# Patient Record
Sex: FEMALE | Race: WHITE | NOT HISPANIC OR LATINO | Employment: OTHER | URBAN - METROPOLITAN AREA
[De-identification: names, ages, dates, MRNs, and addresses within clinical notes are randomized per-mention and may not be internally consistent; named-entity substitution may affect disease eponyms.]

---

## 2021-04-14 PROBLEM — R93.2 ABNORMAL CT OF LIVER: Status: ACTIVE | Noted: 2021-04-14

## 2021-04-14 PROBLEM — K58.9 IBS (IRRITABLE BOWEL SYNDROME): Status: ACTIVE | Noted: 2021-04-14

## 2021-04-14 PROBLEM — M25.551 RIGHT HIP PAIN: Status: ACTIVE | Noted: 2021-04-14

## 2021-04-14 PROBLEM — M54.9 BACK PAIN: Status: ACTIVE | Noted: 2021-04-14

## 2024-11-12 ENCOUNTER — HOSPITAL ENCOUNTER (INPATIENT)
Facility: HOSPITAL | Age: 79
LOS: 3 days | Discharge: NURSING, REHAB OR LONG-TERM CARE | DRG: 854 | End: 2024-11-15
Attending: EMERGENCY MEDICINE | Admitting: INTERNAL MEDICINE
Payer: COMMERCIAL

## 2024-11-12 ENCOUNTER — ANESTHESIA EVENT (INPATIENT)
Dept: PERIOP | Facility: HOSPITAL | Age: 79
DRG: 854 | End: 2024-11-12
Payer: COMMERCIAL

## 2024-11-12 ENCOUNTER — APPOINTMENT (EMERGENCY)
Dept: CT IMAGING | Facility: HOSPITAL | Age: 79
DRG: 854 | End: 2024-11-12
Payer: COMMERCIAL

## 2024-11-12 ENCOUNTER — APPOINTMENT (EMERGENCY)
Dept: RADIOLOGY | Facility: HOSPITAL | Age: 79
DRG: 854 | End: 2024-11-12
Payer: COMMERCIAL

## 2024-11-12 ENCOUNTER — APPOINTMENT (EMERGENCY)
Dept: NON INVASIVE DIAGNOSTICS | Facility: HOSPITAL | Age: 79
DRG: 854 | End: 2024-11-12
Payer: COMMERCIAL

## 2024-11-12 DIAGNOSIS — E46 MALNUTRITION (HCC): ICD-10-CM

## 2024-11-12 DIAGNOSIS — I82.409 DVT (DEEP VENOUS THROMBOSIS) (HCC): ICD-10-CM

## 2024-11-12 DIAGNOSIS — N20.1 URETERIC STONE: ICD-10-CM

## 2024-11-12 DIAGNOSIS — A41.9 SEPSIS (HCC): ICD-10-CM

## 2024-11-12 DIAGNOSIS — R50.9 FEVER: Primary | ICD-10-CM

## 2024-11-12 DIAGNOSIS — N39.0 UTI (URINARY TRACT INFECTION): ICD-10-CM

## 2024-11-12 PROBLEM — E43 SEVERE PROTEIN-CALORIE MALNUTRITION (HCC): Status: ACTIVE | Noted: 2024-11-12

## 2024-11-12 PROBLEM — R56.9 SEIZURES (HCC): Status: ACTIVE | Noted: 2024-11-12

## 2024-11-12 PROBLEM — N13.2 HYDRONEPHROSIS WITH URETERAL CALCULUS: Status: ACTIVE | Noted: 2024-11-12

## 2024-11-12 LAB
2HR DELTA HS TROPONIN: <-7 NG/L
ALBUMIN SERPL BCG-MCNC: 3.5 G/DL (ref 3.5–5)
ALP SERPL-CCNC: 143 U/L (ref 34–104)
ALT SERPL W P-5'-P-CCNC: 4 U/L (ref 7–52)
ANION GAP SERPL CALCULATED.3IONS-SCNC: 11 MMOL/L (ref 4–13)
APTT PPP: 28 SECONDS (ref 23–34)
APTT PPP: >210 SECONDS (ref 23–34)
AST SERPL W P-5'-P-CCNC: 7 U/L (ref 13–39)
ATRIAL RATE: 103 BPM
ATRIAL RATE: 96 BPM
BACTERIA UR QL AUTO: ABNORMAL /HPF
BASOPHILS # BLD MANUAL: 0 THOUSAND/UL (ref 0–0.1)
BASOPHILS NFR MAR MANUAL: 0 % (ref 0–1)
BILIRUB SERPL-MCNC: 0.75 MG/DL (ref 0.2–1)
BILIRUB UR QL STRIP: NEGATIVE
BUN SERPL-MCNC: 15 MG/DL (ref 5–25)
CALCIUM SERPL-MCNC: 10.1 MG/DL (ref 8.4–10.2)
CARDIAC TROPONIN I PNL SERPL HS: 9 NG/L
CARDIAC TROPONIN I PNL SERPL HS: <2 NG/L
CHLORIDE SERPL-SCNC: 100 MMOL/L (ref 96–108)
CLARITY UR: ABNORMAL
CO2 SERPL-SCNC: 25 MMOL/L (ref 21–32)
COLOR UR: YELLOW
CREAT SERPL-MCNC: 1.48 MG/DL (ref 0.6–1.3)
EOSINOPHIL # BLD MANUAL: 0 THOUSAND/UL (ref 0–0.4)
EOSINOPHIL NFR BLD MANUAL: 0 % (ref 0–6)
ERYTHROCYTE [DISTWIDTH] IN BLOOD BY AUTOMATED COUNT: 13.8 % (ref 11.6–15.1)
FLUAV AG UPPER RESP QL IA.RAPID: NEGATIVE
FLUBV AG UPPER RESP QL IA.RAPID: NEGATIVE
GFR SERPL CREATININE-BSD FRML MDRD: 33 ML/MIN/1.73SQ M
GLUCOSE SERPL-MCNC: 100 MG/DL (ref 65–140)
GLUCOSE UR STRIP-MCNC: NEGATIVE MG/DL
HCT VFR BLD AUTO: 38.5 % (ref 34.8–46.1)
HGB BLD-MCNC: 12.5 G/DL (ref 11.5–15.4)
HGB UR QL STRIP.AUTO: ABNORMAL
INR PPP: 1.32 (ref 0.85–1.19)
KETONES UR STRIP-MCNC: NEGATIVE MG/DL
LACTATE SERPL-SCNC: 0.7 MMOL/L (ref 0.5–2)
LACTATE SERPL-SCNC: 4.8 MMOL/L (ref 0.5–2)
LEUKOCYTE ESTERASE UR QL STRIP: ABNORMAL
LG PLATELETS BLD QL SMEAR: PRESENT
LIPASE SERPL-CCNC: 16 U/L (ref 11–82)
LYMPHOCYTES # BLD AUTO: 0.3 THOUSAND/UL (ref 0.6–4.47)
LYMPHOCYTES # BLD AUTO: 2 % (ref 14–44)
MCH RBC QN AUTO: 28.2 PG (ref 26.8–34.3)
MCHC RBC AUTO-ENTMCNC: 32.5 G/DL (ref 31.4–37.4)
MCV RBC AUTO: 87 FL (ref 82–98)
MONOCYTES # BLD AUTO: 0.15 THOUSAND/UL (ref 0–1.22)
MONOCYTES NFR BLD: 1 % (ref 4–12)
NEUTROPHILS # BLD MANUAL: 14.6 THOUSAND/UL (ref 1.85–7.62)
NEUTS BAND NFR BLD MANUAL: 9 % (ref 0–8)
NEUTS SEG NFR BLD AUTO: 88 % (ref 43–75)
NITRITE UR QL STRIP: POSITIVE
NON-SQ EPI CELLS URNS QL MICRO: ABNORMAL /HPF
P AXIS: 71 DEGREES
P AXIS: 74 DEGREES
PH UR STRIP.AUTO: 6.5 [PH]
PLATELET # BLD AUTO: 321 THOUSANDS/UL (ref 149–390)
PLATELET BLD QL SMEAR: ADEQUATE
PMV BLD AUTO: 9.8 FL (ref 8.9–12.7)
POTASSIUM SERPL-SCNC: 3.4 MMOL/L (ref 3.5–5.3)
PR INTERVAL: 166 MS
PR INTERVAL: 168 MS
PROT SERPL-MCNC: 6.5 G/DL (ref 6.4–8.4)
PROT UR STRIP-MCNC: ABNORMAL MG/DL
PROTHROMBIN TIME: 16.5 SECONDS (ref 12.3–15)
QRS AXIS: -50 DEGREES
QRS AXIS: -53 DEGREES
QRSD INTERVAL: 82 MS
QRSD INTERVAL: 84 MS
QT INTERVAL: 330 MS
QT INTERVAL: 342 MS
QTC INTERVAL: 432 MS
QTC INTERVAL: 432 MS
RBC # BLD AUTO: 4.43 MILLION/UL (ref 3.81–5.12)
RBC #/AREA URNS AUTO: ABNORMAL /HPF
RBC MORPH BLD: NORMAL
RENAL EPI CELLS #/AREA URNS HPF: PRESENT /[HPF]
SARS-COV+SARS-COV-2 AG RESP QL IA.RAPID: NEGATIVE
SODIUM SERPL-SCNC: 136 MMOL/L (ref 135–147)
SP GR UR STRIP.AUTO: 1.01 (ref 1–1.03)
T WAVE AXIS: 61 DEGREES
T WAVE AXIS: 67 DEGREES
TRANS CELLS #/AREA URNS HPF: PRESENT /[HPF]
UROBILINOGEN UR STRIP-ACNC: <2 MG/DL
VENTRICULAR RATE: 103 BPM
VENTRICULAR RATE: 96 BPM
WBC # BLD AUTO: 15.05 THOUSAND/UL (ref 4.31–10.16)
WBC #/AREA URNS AUTO: ABNORMAL /HPF

## 2024-11-12 PROCEDURE — 96367 TX/PROPH/DG ADDL SEQ IV INF: CPT

## 2024-11-12 PROCEDURE — 85027 COMPLETE CBC AUTOMATED: CPT | Performed by: EMERGENCY MEDICINE

## 2024-11-12 PROCEDURE — 36415 COLL VENOUS BLD VENIPUNCTURE: CPT | Performed by: EMERGENCY MEDICINE

## 2024-11-12 PROCEDURE — 93005 ELECTROCARDIOGRAM TRACING: CPT

## 2024-11-12 PROCEDURE — 83690 ASSAY OF LIPASE: CPT | Performed by: EMERGENCY MEDICINE

## 2024-11-12 PROCEDURE — 87040 BLOOD CULTURE FOR BACTERIA: CPT | Performed by: EMERGENCY MEDICINE

## 2024-11-12 PROCEDURE — 99223 1ST HOSP IP/OBS HIGH 75: CPT | Performed by: STUDENT IN AN ORGANIZED HEALTH CARE EDUCATION/TRAINING PROGRAM

## 2024-11-12 PROCEDURE — 87077 CULTURE AEROBIC IDENTIFY: CPT | Performed by: EMERGENCY MEDICINE

## 2024-11-12 PROCEDURE — 93970 EXTREMITY STUDY: CPT

## 2024-11-12 PROCEDURE — 84484 ASSAY OF TROPONIN QUANT: CPT | Performed by: EMERGENCY MEDICINE

## 2024-11-12 PROCEDURE — 99285 EMERGENCY DEPT VISIT HI MDM: CPT

## 2024-11-12 PROCEDURE — 85007 BL SMEAR W/DIFF WBC COUNT: CPT | Performed by: EMERGENCY MEDICINE

## 2024-11-12 PROCEDURE — 71260 CT THORAX DX C+: CPT

## 2024-11-12 PROCEDURE — 85730 THROMBOPLASTIN TIME PARTIAL: CPT | Performed by: STUDENT IN AN ORGANIZED HEALTH CARE EDUCATION/TRAINING PROGRAM

## 2024-11-12 PROCEDURE — 71045 X-RAY EXAM CHEST 1 VIEW: CPT

## 2024-11-12 PROCEDURE — 93970 EXTREMITY STUDY: CPT | Performed by: SURGERY

## 2024-11-12 PROCEDURE — 96365 THER/PROPH/DIAG IV INF INIT: CPT

## 2024-11-12 PROCEDURE — 85025 COMPLETE CBC W/AUTO DIFF WBC: CPT | Performed by: EMERGENCY MEDICINE

## 2024-11-12 PROCEDURE — 74177 CT ABD & PELVIS W/CONTRAST: CPT

## 2024-11-12 PROCEDURE — 85730 THROMBOPLASTIN TIME PARTIAL: CPT | Performed by: EMERGENCY MEDICINE

## 2024-11-12 PROCEDURE — 83605 ASSAY OF LACTIC ACID: CPT | Performed by: EMERGENCY MEDICINE

## 2024-11-12 PROCEDURE — 87154 CUL TYP ID BLD PTHGN 6+ TRGT: CPT | Performed by: EMERGENCY MEDICINE

## 2024-11-12 PROCEDURE — 87086 URINE CULTURE/COLONY COUNT: CPT | Performed by: EMERGENCY MEDICINE

## 2024-11-12 PROCEDURE — 87811 SARS-COV-2 COVID19 W/OPTIC: CPT | Performed by: EMERGENCY MEDICINE

## 2024-11-12 PROCEDURE — 99285 EMERGENCY DEPT VISIT HI MDM: CPT | Performed by: EMERGENCY MEDICINE

## 2024-11-12 PROCEDURE — 87804 INFLUENZA ASSAY W/OPTIC: CPT | Performed by: EMERGENCY MEDICINE

## 2024-11-12 PROCEDURE — 93010 ELECTROCARDIOGRAM REPORT: CPT | Performed by: INTERNAL MEDICINE

## 2024-11-12 PROCEDURE — 81001 URINALYSIS AUTO W/SCOPE: CPT | Performed by: EMERGENCY MEDICINE

## 2024-11-12 PROCEDURE — 87186 SC STD MICRODIL/AGAR DIL: CPT | Performed by: EMERGENCY MEDICINE

## 2024-11-12 PROCEDURE — 80053 COMPREHEN METABOLIC PANEL: CPT | Performed by: EMERGENCY MEDICINE

## 2024-11-12 PROCEDURE — 96361 HYDRATE IV INFUSION ADD-ON: CPT

## 2024-11-12 PROCEDURE — 85610 PROTHROMBIN TIME: CPT | Performed by: EMERGENCY MEDICINE

## 2024-11-12 RX ORDER — ONDANSETRON 2 MG/ML
4 INJECTION INTRAMUSCULAR; INTRAVENOUS ONCE AS NEEDED
Status: DISCONTINUED | OUTPATIENT
Start: 2024-11-12 | End: 2024-11-15 | Stop reason: HOSPADM

## 2024-11-12 RX ORDER — AMLODIPINE BESYLATE 10 MG/1
10 TABLET ORAL DAILY
COMMUNITY
Start: 2024-10-01

## 2024-11-12 RX ORDER — OXCARBAZEPINE 150 MG/1
150 TABLET, FILM COATED ORAL 2 TIMES DAILY
Status: DISCONTINUED | OUTPATIENT
Start: 2024-11-12 | End: 2024-11-15 | Stop reason: HOSPADM

## 2024-11-12 RX ORDER — HEPARIN SODIUM 1000 [USP'U]/ML
5600 INJECTION, SOLUTION INTRAVENOUS; SUBCUTANEOUS EVERY 6 HOURS PRN
Status: DISCONTINUED | OUTPATIENT
Start: 2024-11-12 | End: 2024-11-13

## 2024-11-12 RX ORDER — HEPARIN SODIUM 10000 [USP'U]/100ML
3-30 INJECTION, SOLUTION INTRAVENOUS
Status: DISCONTINUED | OUTPATIENT
Start: 2024-11-12 | End: 2024-11-13

## 2024-11-12 RX ORDER — POTASSIUM CHLORIDE 1500 MG/1
20 TABLET, EXTENDED RELEASE ORAL DAILY
COMMUNITY
Start: 2024-10-01

## 2024-11-12 RX ORDER — VENLAFAXINE HYDROCHLORIDE 75 MG/1
75 CAPSULE, EXTENDED RELEASE ORAL DAILY
Status: DISCONTINUED | OUTPATIENT
Start: 2024-11-13 | End: 2024-11-15 | Stop reason: HOSPADM

## 2024-11-12 RX ORDER — OXCARBAZEPINE 150 MG/1
150 TABLET, FILM COATED ORAL 2 TIMES DAILY
COMMUNITY
Start: 2024-10-01

## 2024-11-12 RX ORDER — ACETAMINOPHEN 325 MG/1
650 TABLET ORAL ONCE
Status: DISCONTINUED | OUTPATIENT
Start: 2024-11-12 | End: 2024-11-12

## 2024-11-12 RX ORDER — ACETAMINOPHEN 10 MG/ML
1000 INJECTION, SOLUTION INTRAVENOUS ONCE
Status: COMPLETED | OUTPATIENT
Start: 2024-11-12 | End: 2024-11-12

## 2024-11-12 RX ORDER — HEPARIN SODIUM 1000 [USP'U]/ML
2800 INJECTION, SOLUTION INTRAVENOUS; SUBCUTANEOUS EVERY 6 HOURS PRN
Status: DISCONTINUED | OUTPATIENT
Start: 2024-11-12 | End: 2024-11-13

## 2024-11-12 RX ORDER — LEVOTHYROXINE SODIUM 75 UG/1
75 TABLET ORAL
Status: DISCONTINUED | OUTPATIENT
Start: 2024-11-13 | End: 2024-11-15 | Stop reason: HOSPADM

## 2024-11-12 RX ORDER — AMLODIPINE BESYLATE 10 MG/1
10 TABLET ORAL DAILY
Status: DISCONTINUED | OUTPATIENT
Start: 2024-11-13 | End: 2024-11-15 | Stop reason: HOSPADM

## 2024-11-12 RX ORDER — HEPARIN SODIUM 1000 [USP'U]/ML
5600 INJECTION, SOLUTION INTRAVENOUS; SUBCUTANEOUS ONCE
Status: COMPLETED | OUTPATIENT
Start: 2024-11-12 | End: 2024-11-12

## 2024-11-12 RX ADMIN — HEPARIN SODIUM 5600 UNITS: 1000 INJECTION INTRAVENOUS; SUBCUTANEOUS at 16:59

## 2024-11-12 RX ADMIN — SODIUM CHLORIDE 1250 ML: 0.9 INJECTION, SOLUTION INTRAVENOUS at 13:29

## 2024-11-12 RX ADMIN — OXCARBAZEPINE 150 MG: 150 TABLET, FILM COATED ORAL at 18:09

## 2024-11-12 RX ADMIN — CEFEPIME 2000 MG: 2 INJECTION, POWDER, FOR SOLUTION INTRAVENOUS at 13:30

## 2024-11-12 RX ADMIN — IOHEXOL 100 ML: 350 INJECTION, SOLUTION INTRAVENOUS at 14:12

## 2024-11-12 RX ADMIN — HEPARIN SODIUM 18 UNITS/KG/HR: 10000 INJECTION, SOLUTION INTRAVENOUS at 17:05

## 2024-11-12 RX ADMIN — SODIUM CHLORIDE 1000 ML: 0.9 INJECTION, SOLUTION INTRAVENOUS at 12:28

## 2024-11-12 RX ADMIN — ACETAMINOPHEN 1000 MG: 10 INJECTION INTRAVENOUS at 13:08

## 2024-11-12 NOTE — ASSESSMENT & PLAN NOTE
Appears to have been started 2023 after neurology from [facility] suspected seizures causing repeated episodes of altered mentation.   Continue trileptal  On venlafaxine

## 2024-11-12 NOTE — ASSESSMENT & PLAN NOTE
Present on admission. Reviewed chart, I am not sure if this is similar to her previous diagnosis of left vulvar basal carcinoma where plastics and gyn recommended surgical intervention.   Wound care consulted

## 2024-11-12 NOTE — SEPSIS NOTE
"  Sepsis Note   Tiana De La Cruz 78 y.o. female MRN: [id number]  Unit/Bed#: ED-07 Encounter: [id number]       Initial Sepsis Screening       Row Name 11/12/24 1325                Is the patient's history suggestive of a new or worsening infection? Yes (Proceed)  -SA        Suspected source of infection urinary tract infection  -SA        Indicate SIRS criteria Hyperthemia > 38.3C (100.9F) OR Hypothermia <36C (96.8F);Leukocytosis (WBC > 53508 IJL) OR Leukopenia (WBC <4000 IJL) OR Bandemia (WBC >10% bands)  -SA        Are two or more of the above signs & symptoms of infection both present and new to the patient? Yes (Proceed)  -SA        Assess for evidence of organ dysfunction: Are any of the below criteria present within 6 hours of suspected infection and SIRS criteria that are NOT considered to be chronic conditions? Lactate >/equal 4.0  -SA        Date of presentation of severe sepsis 11/12/24  -        Time of presentation of severe sepsis 1326  -        Date of presentation of septic shock --        Time of presentation of septic shock --        Fluid Resuscitation: --        Sepsis Note: Click \"NEXT\" below (NOT \"close\") to generate sepsis note based on above information. --                  User Key  (r) = Recorded By, (t) = Taken By, (c) = Cosigned By      Initials Name Provider Type    SA Elie Linares MD Physician                    Default Flowsheet Data (Last 720 Hours)       Sepsis Reassess       Row Name 11/12/24 1558 11/12/24 1557 11/12/24 1556 11/12/24 1505          Repeat Volume Status and Tissue Perfusion Assessment Performed    Date of Reassessment: 11/12/24  -SA 11/12/24  -SA 11/12/24  -SA 11/12/24  -     Time of Reassessment: 1558  -SA 1557  -SA -- --     Sepsis Reassessment Note: Click \"NEXT\" below (NOT \"close\") to generate sepsis reassessment note. YES (proceed by clicking \"NEXT\")  -SA -- -- YES (proceed by clicking \"NEXT\")  -     Repeat Volume Status and Tissue Perfusion Assessment Performed " -- -- -- --               User Key  (r) = Recorded By, (t) = Taken By, (c) = Cosigned By      Initials Name Provider Type    SA Elie Linares MD Physician                    Body mass index is 25 kg/m².  Wt Readings from Last 1 Encounters:   11/12/24 72.4 kg (159 lb 9.8 oz)     IBW (Ideal Body Weight): 61.6 kg    Ideal body weight: 61.6 kg (135 lb 12.9 oz)  Adjusted ideal body weight: 65.9 kg (145 lb 5.2 oz)

## 2024-11-12 NOTE — H&P
"H&P - Hospitalist   Name: Tiana De La Cruz 78 y.o. female I MRN: [id number]  Unit/Bed#: ED-07 I Date of Admission: 11/12/2024   Date of Service: 11/12/2024 I Hospital Day: 0     Assessment & Plan  Hydronephrosis with ureteral calculus  78 year old female with dementia, hypothyroidism, malnutrition was brought in here due to fever and pain.  Urinalysis concerning for UTI. Continue Ceftriaxone  Urology consulted, procedure planned for tomorrow. NPO after midnight  Await cultures  DVT (deep venous thrombosis) (HCC)  New diagnosis.  CT showing: Left iliac and right common femoral vein DVT suspected. Recommend further characterization with DVT ultrasound.   Awaiting official read of VAS Duplex. Prelim positive for DVT  For planned urologic procedure tomorrow. Begin heparin drip.   Seizures (HCC)  Appears to have been started 2023 after neurology from [facility] suspected seizures causing repeated episodes of altered mentation.   Continue trileptal  On venlafaxine  Severe protein-calorie malnutrition (HCC)  Nutrition evaluation pending  Disruption of perineal wound in female  Present on admission. Reviewed chart, I am not sure if this is similar to her previous diagnosis of left vulvar basal carcinoma where plastics and gyn recommended surgical intervention.   Wound care consulted          VTE Pharmacologic Prophylaxis:   Moderate Risk (Score 3-4) - Pharmacological DVT Prophylaxis Ordered: heparin drip.  Code Status: No Order Full code  Discussion with family: attempted to call son to discuss code status, no answer    Anticipated Length of Stay: Patient will be admitted on an inpatient basis with an anticipated length of stay of greater than 2 midnights secondary to iv hydration, iv antibiotics, urology evaluation and intervention .    History of Present Illness   Chief Complaint: \"I don't know\"    Tiana De La Cruz is a 78 y.o. female with a PMH of depression, dementia, malnutrition, basal cell carcinoma of the vulva and gluteal fold, " "hyperlipidemia, and hypothyroidism who presents with fever.    Patient has dementia. History was obtained through ED and EMS reports.    \"Staff stated that the patient had a blood pressure of 200/80 when they took it. Staff stated that this morning the patient had a fever of 100.7 and they gave her Tylenol. The staff stated that an hour later they took the Patient's temperature and its was 99.4 and she was vomiting. Staff stated that they let the doctor at the facility know about the patient and they wanted her sent out. Staff stated that the patient is acting to her baseline of confusion. Staff left the patient soiled and stated that nightshift left her like that. The patient stated that she is having some leg pain, unspecified where or from what, no deformities noted to her legs. Staff stated that the patient had some generalized abdominal pain.\"    Upon arrival at our ED, workup was done.  She met sepsis criteria as a result of fevers and elevated white count levels.  CT abdomen pelvis was performed which showed a 7 mm right proximal ureteral calculus with moderate hydronephrosis and hydroureter.  Urology was consulted who requested that she be placed on n.p.o. status for a planned procedure/cystoscopy tomorrow.    Additional findings include a left iliac and right common femoral vein DVT.  Duplex was performed where preliminary results confirm suspected DVT. She also had a buttock wound which was present on admission.         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  Prior to Admission medications    Medication Sig Start Date End Date Taking? Authorizing Provider   amLODIPine (NORVASC) 10 mg tablet Take 10 mg by mouth daily 10/1/24  Yes Historical Provider, MD   Cholecalciferol 125 MCG (5000 UT) capsule Take 5,000 Units by mouth daily   Yes Historical Provider, MD   levothyroxine 75 mcg tablet Take 75 mcg by mouth daily in the early morning   Yes Historical Provider, MD   Multiple Vitamin tablet Take 1 tablet by mouth daily   Yes Historical Provider, MD   OXcarbazepine (TRILEPTAL) 150 mg tablet Take 150 mg by mouth 2 (two) times a day 10/1/24  Yes Historical Provider, MD   potassium chloride (Klor-Con M20) 20 mEq tablet Take 20 mEq by mouth daily 10/1/24  Yes Historical Provider, MD   venlafaxine (EFFEXOR-XR) 75 mg 24 hr capsule Take 75 mg by mouth daily   Yes Historical Provider, MD   aspirin (ECOTRIN LOW STRENGTH) 81 mg EC tablet Take 81 mg by mouth daily  11/12/24 Yes Historical Provider, MD   Ascorbic Acid (VITAMIN C PO) Take by mouth  11/12/24  Historical Provider, MD   CALCIUM PO Take by mouth  11/12/24  Historical Provider, MD   carboxymethylcellulose (REFRESH PLUS) 0.5 % SOLN INSTILL ONE DROP BOTH EYES THREE TIMES A DAY FOR DRY EYES 6/28/21 11/12/24  Historical Provider, MD   hydrochlorothiazide (HYDRODIURIL) 25 mg tablet Take 25 mg by mouth daily  Patient not taking:  Reported on 6/29/2022 11/12/24  Historical Provider, MD   hyoscyamine (ANASPAZ,LEVSIN) 0.125 MG tablet Take 1 tablet (0.125 mg total) by mouth every 4 (four) hours as needed for cramping 9/30/22 11/12/24  Marnie Hortencia ROSY Yancey   lisinopril (ZESTRIL) 5 mg tablet Take 15 mg by mouth daily  11/12/24  Historical Provider, MD     Allergies   Allergen Reactions    Medical Tape Rash       Objective :  Temp:  [98.9 °F (37.2 °C)-101.9 °F (38.8 °C)] 99.7 °F (37.6 °C)  HR:  [] 93  BP: (123-143)/(60-73) 139/64  Resp:  [18-24] 18  SpO2:  [93 %-96 %] 93 %  O2 Device: None (Room air)    Physical Exam  Vitals reviewed.   Constitutional:       General: She is not in acute distress.     Appearance: She is ill-appearing.   HENT:      Head: Normocephalic.      Nose: Nose normal.      Mouth/Throat:      Mouth: Mucous membranes are moist.   Eyes:      General: No scleral icterus.  Cardiovascular:      Rate and Rhythm: Normal rate and regular rhythm.   Pulmonary:      Effort: Pulmonary effort is normal. No respiratory distress.      Breath sounds: No wheezing.   Abdominal:      General: There is no distension.      Palpations: Abdomen is soft.      Tenderness: There is no abdominal tenderness.   Skin:     General: Skin is warm.   Neurological:      Mental Status: She is alert.   Psychiatric:         Mood and Affect: Mood normal.         Behavior: Behavior normal.       Lines/Drains:            Lab Results: I have reviewed the following results:  Results from last 7 days   Lab Units 11/12/24  1140   WBC Thousand/uL 15.05*   HEMOGLOBIN g/dL 12.5   HEMATOCRIT % 38.5   PLATELETS Thousands/uL 321   BANDS PCT % 9*   LYMPHO PCT % 2*   MONO PCT % 1*   EOS PCT % 0     Results from last 7 days   Lab Units 11/12/24  1140   SODIUM mmol/L 136   POTASSIUM mmol/L 3.4*   CHLORIDE mmol/L 100   CO2 mmol/L 25   BUN mg/dL 15   CREATININE mg/dL 1.48*   ANION GAP mmol/L 11   CALCIUM mg/dL 10.1   ALBUMIN g/dL 3.5   TOTAL BILIRUBIN mg/dL 0.75   ALK  PHOS U/L 143*   ALT U/L 4*   AST U/L 7*   GLUCOSE RANDOM mg/dL 100     Results from last 7 days   Lab Units 11/12/24  1140   INR  1.32*         Lab Results   Component Value Date    HGBA1C 5.6 01/08/2018     Results from last 7 days   Lab Units 11/12/24  1337 11/12/24  1140   LACTIC ACID mmol/L 0.7 4.8*     Xr chest, ct chest, vas venous duplex  ECG: Sinus tachycardia, LAD, no previous ecgs to compare besides this admission    ** Please Note: This note has been constructed using a voice recognition system. **

## 2024-11-12 NOTE — ASSESSMENT & PLAN NOTE
New diagnosis.  CT showing: Left iliac and right common femoral vein DVT suspected. Recommend further characterization with DVT ultrasound.   Awaiting official read of VAS Duplex. Prelim positive for DVT  For planned urologic procedure tomorrow. Begin heparin drip.

## 2024-11-12 NOTE — ED PROVIDER NOTES
Time reflects when diagnosis was documented in both MDM as applicable and the Disposition within this note       Time User Action Codes Description Comment    11/12/2024  2:50 PM Alam, Elie Add [R50.9] Fever     11/12/2024  2:52 PM Alam, Elie Add [N39.0] UTI (urinary tract infection)     11/12/2024  3:53 PM Alam, Elie Add [N20.1] Ureteric stone     11/12/2024  3:55 PM Alam, Elie Add [A41.9] Sepsis ([facility])     11/12/2024  4:33 PM Alam, Elie Add [I82.409] DVT (deep venous thrombosis) ([facility])     11/12/2024  5:28 PM Yimi Barksdale [E46] Malnutrition ([facility])           ED Disposition       ED Disposition   Admit    Condition   Stable    Date/Time   Tue Nov 12, 2024  3:53 PM    Comment   Case was discussed with Dr. Barksdale and the patient's admission status was agreed to be Admission Status: inpatient status to the service of Dr. Barksdale.               Assessment & Plan       Medical Decision Making  Patient is a 77 yo female, hx of Dementia, comes with c/o fever at facility, was given Tylenol, has wound to left perineal/buttock area, patient denies any symptoms, no HA, CP, Abd pain, limited hx due to Dementia. On exam, patient is conscious, alert, vitals noted for elevated rectal temp, Lungs CTA, CVS RRR, large deep left perineal wound.  D/D:    Problems Addressed:  DVT (deep venous thrombosis) ([facility]): acute illness or injury  Fever: acute illness or injury  Sepsis ([facility]): acute illness or injury  Ureteric stone: acute illness or injury  UTI (urinary tract infection): acute illness or injury    Amount and/or Complexity of Data Reviewed  Labs: ordered. Decision-making details documented in ED Course.  Radiology: ordered. Decision-making details documented in ED Course.  ECG/medicine tests: ordered and independent interpretation performed. Decision-making details documented in ED Course.    Risk  Prescription drug management.  Decision regarding hospitalization.        ED Course as of 11/12/24 2131 Tue Nov 12,  2024   1222 WBC(!): 15.05   1222 Hemoglobin: 12.5   1222 Platelet Count: 321  CBC reviewed, leukocytosis noted.   1259 XR chest 1 view portable  Chest x-ray independently reviewed, possible right lower lobe consolidation.   1303 Temperature(!)(S): 101.9 °F (38.8 °C)   1303 Temp Source: Rectal  Rectal temp noted for fever, will give Tylenol.   1319 LACTIC ACID(!!): 4.8  Lactic noted, we will call Sepsis alert, give IV antibiotics, IV fluids 30 mL/kg.   1321 UA w Reflex to Microscopic w Reflex to Culture(!)   1322 Leukocytes, UA(!): Large   1322 Nitrite, UA(!): Positive  Urine noted for large leukocytes and nitrites.   1445 LACTIC ACID: 0.7  Repeat lactic acid normal.   1536 Radiologist informs that there is possible bilateral DVT on CT, left side more concerning, will get duplex BLE.   1537 Also 7 mm right proximal ureteric stone with hydronephrosis.   1545 CT chest abdomen pelvis w contrast  CT scan results reviewed:    IMPRESSION:        1. 7 mm right proximal ureteral calculus with moderate hydronephrosis and hydroureter. There is associated urothelial thickening and enhancement suggesting associated ureteritis  2. Left iliac and right common femoral vein DVT suspected. Recommend further characterization with DVT ultrasound.  3. Left perineal defect may correspond to the wound. No fluid collection seen deep to this wound.     1546 Case discussed with Urology, Adali Guidry.   1559 Sepsis reassessment was done, vital signs improved, blood pressure stable, fever came down, lactic acid cleared.   1604 Case discussed with urology due to concern for UTI, sepsis, proximal ureteric stone with hydronephrosis, will plan for intervention/stent tomorrow.  Case also discussed with JIAN Gold.   1629 Preliminary duplex results as per vascular tech:    Positive DVT right common femoral, deep femoral and posterior tibial veins. Positive DVT Left External iliac, common femoral, femoral, and popliteal veins.    1630  Discussed with admitting provider, will start heparin.       Medications   ondansetron (ZOFRAN) injection 4 mg (4 mg Intravenous Not Given 11/12/24 1309)   heparin (porcine) 25,000 units in 0.45% NaCl 250 mL infusion (premix) (18 Units/kg/hr × 70 kg (Order-Specific) Intravenous New Bag 11/12/24 1705)   heparin (porcine) injection 5,600 Units (has no administration in time range)   heparin (porcine) injection 2,800 Units (has no administration in time range)   sodium chloride 0.9 % bolus 1,000 mL (0 mL Intravenous Stopped 11/12/24 1314)   acetaminophen (Ofirmev) injection 1,000 mg (0 mg Intravenous Stopped 11/12/24 1325)   cefepime (MAXIPIME) 2 g/50 mL dextrose IVPB (0 mg Intravenous Stopped 11/12/24 1350)   sodium chloride 0.9 % bolus 1,250 mL (0 mL Intravenous Stopped 11/12/24 1428)   iohexol (OMNIPAQUE) 350 MG/ML injection (SINGLE-DOSE) 100 mL (100 mL Intravenous Given 11/12/24 1412)   heparin (porcine) injection 5,600 Units (5,600 Units Intravenous Given 11/12/24 1659)       ED Risk Strat Scores                           SBIRT 22yo+      Flowsheet Row Most Recent Value   Initial Alcohol Screen: US AUDIT-C     1. How often do you have a drink containing alcohol? 0 Filed at: 11/12/2024 1451   2. How many drinks containing alcohol do you have on a typical day you are drinking?  0 Filed at: 11/12/2024 1451   3a. Male UNDER 65: How often do you have five or more drinks on one occasion? 0 Filed at: 11/12/2024 1451   3b. FEMALE Any Age, or MALE 65+: How often do you have 4 or more drinks on one occassion? 0 Filed at: 11/12/2024 1451   Audit-C Score 0 Filed at: 11/12/2024 1451   BEN: How many times in the past year have you...    Used an illegal drug or used a prescription medication for non-medical reasons? Never Filed at: 11/12/2024 1451                            History of Present Illness       Chief Complaint   Patient presents with    Fever     Brought in via ems from [facility]. Per EMS facility stated patient had  fever this morning and they administered Tylenol. Patient with hx of dementia oriented to self. Wound on left inner buttocks noted.        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       History provided by:  Patient   used: No    Fever  Quality:  Fever  Severity:  Unable to specify  Onset quality:  Gradual  Duration:  1 day  Timing:  Sporadic  Progression:  Improving  Chronicity:  New  Context:  Fever at facility, Tylenol given, patient with dementia, does not offer complaints  Relieved by:  Nothing  Worsened by:  Nothing  Ineffective treatments:  None  Associated symptoms: fever        Review of Systems   Unable to perform ROS: Dementia   Constitutional:  Positive for fever.           Objective       ED Triage Vitals   Temperature Pulse Blood Pressure Respirations SpO2 Patient Position - Orthostatic VS   11/12/24 1132 11/12/24 1132 11/12/24 1132 11/12/24 1132 11/12/24 1132 11/12/24 1132   98.9 °F (37.2 °C) 80 123/60 19 95 % Lying      Temp Source Heart Rate Source BP Location FiO2 (%) Pain Score    11/12/24 1132 11/12/24 1132 11/12/24 1132 -- 11/12/24 1400     Oral Monitor Right arm  No Pain      Vitals      Date and Time Temp Pulse SpO2 Resp BP Pain Score FACES Pain Rating User   11/12/24 2117 98.4 °F (36.9 °C) 85 92 % 20 132/92 -- -- DII   11/12/24 1756 -- -- 92 % -- -- No Pain -- SB   11/12/24 1737 97.9 °F (36.6 °C) 92 94 % -- 141/72 -- -- DII   11/12/24 1700 -- 87 93 % 18 131/59 -- -- TA   11/12/24 1551 99.7 °F (37.6 °C) -- -- -- -- -- -- JK   11/12/24 1530 -- 93 93 % 18 139/64 -- -- TA   11/12/24 1515 -- 98 96 % 18 140/67 -- -- EG   11/12/24 1500 -- 96 96 % 18 143/68 -- -- EG   11/12/24 1400 -- 94 94 % 24 140/73 No Pain -- MF   11/12/24 1331 -- 96 93 % 18 129/63 -- -- DIC   11/12/24 1238  101.9 °F (38.8 °C) -- -- -- -- -- -- MF   11/12/24 1230 -- 116 95 % 18 143/73 -- -- MF   11/12/24 1132 98.9 °F (37.2 °C) 80 95 % 19 123/60 -- -- YC            Physical Exam  Vitals and nursing note reviewed.   Constitutional:       General: She is not in acute distress.     Appearance: She is well-developed.   HENT:      Head: Normocephalic and atraumatic.   Eyes:      Extraocular Movements: Extraocular movements intact.   Cardiovascular:      Rate and Rhythm: Normal rate and regular rhythm.      Heart sounds: Normal heart sounds.   Pulmonary:      Effort: Pulmonary effort is normal.      Breath sounds: Normal breath sounds.   Abdominal:      Palpations: Abdomen is soft.      Tenderness: There is no abdominal tenderness. There is no guarding or rebound.      Comments: Large deep perineal wound, exam done under presence of ORACIO Sutton   Musculoskeletal:         General: Normal range of motion.      Cervical back: Normal range of motion and neck supple.   Skin:     General: Skin is warm and dry.   Neurological:      General: No focal deficit present.      Mental Status: She is alert.      Comments: Dementia status, orientd at baseline   Psychiatric:         Mood and Affect: Mood normal.         Behavior: Behavior normal.         Results Reviewed       Procedure Component  Value Units Date/Time    Blood culture #1 [[id number]] Collected: 11/12/24 1248    Lab Status: Preliminary result Specimen: Blood from Arm, Right Updated: 11/12/24 2101     Blood Culture Received in Microbiology Lab. Culture in Progress.    Blood culture #2 [[id number]] Collected: 11/12/24 1255    Lab Status: Preliminary result Specimen: Blood from Arm, Left Updated: 11/12/24 2101     Blood Culture Received in Microbiology Lab. Culture in Progress.    FLU/COVID Rapid Antigen (30 min. TAT) - Preferred screening test in ED [[id number]]  (Normal) Resulted: 11/12/24 1759    Lab Status: Final result Specimen: Nares from Nose Updated: 11/12/24 1759     SARS COV Rapid Antigen Negative     Influenza A Rapid Antigen Negative     Influenza B Rapid Antigen Negative    Narrative:      This test has been performed using the TuVoxidel Renu 2 FLU+SARS Antigen test under the Emergency Use Authorization (EUA). This test has been validated by the  and verified by the performing laboratory. The Renu uses lateral flow immunofluorescent sandwich assay to detect SARS-COV, Influenza A and Influenza B Antigen.     The Quidel Renu 2 SARS Antigen test does not differentiate between SARS-CoV and SARS-CoV-2.     Negative results are presumptive and may be confirmed with a molecular assay, if necessary, for patient management. Negative results do not rule out SARS-CoV-2 or influenza infection and should not be used as the sole basis for treatment or patient management decisions. A negative test result may occur if the level of antigen in a sample is below the limit of detection of this test.     Positive results are indicative of the presence of viral antigens, but do not rule out bacterial infection or co-infection with other viruses.     All test results should be used as an adjunct to clinical observations and other information available to the provider.    FOR PEDIATRIC PATIENTS - copy/paste COVID Guidelines URL to browser:  [email or web address]    Manual Differential(PHLEBS Do Not Order) [[id number]]  (Abnormal) Collected: 11/12/24 1140    Lab Status: Final result Specimen: Blood from Arm, Left Updated: 11/12/24 1419     Segmented % 88 %      Bands % 9 %      Lymphocytes % 2 %      Monocytes % 1 %      Eosinophils % 0 %      Basophils % 0 %      Absolute Neutrophils 14.60 Thousand/uL      Absolute Lymphocytes 0.30 Thousand/uL      Absolute Monocytes 0.15 Thousand/uL      Absolute Eosinophils 0.00 Thousand/uL      Absolute Basophils 0.00 Thousand/uL      Total Counted --     RBC Morphology Normal     Platelet Estimate Adequate     Large Platelet Present    HS Troponin I 2hr [[id number]]  (Normal) Collected: 11/12/24 1337    Lab Status: Final result Specimen: Blood from Arm, Left Updated: 11/12/24 1409     hs TnI 2hr <2 ng/L      Delta 2hr hsTnI <-7 ng/L     Lactic acid 2 Hours [[id number]]  (Normal) Collected: 11/12/24 1337    Lab Status: Final result Specimen: Blood from Arm, Left Updated: 11/12/24 1407     LACTIC ACID 0.7 mmol/L     Narrative:      Result may be elevated if tourniquet was used during collection.    Urine Microscopic [[id number]]  (Abnormal) Collected: 11/12/24 1244    Lab Status: Final result Specimen: Urine, Straight Cath Updated: 11/12/24 1332     RBC, UA 4-10 /hpf      WBC, UA Innumerable /hpf      Epithelial Cells Occasional /hpf      Bacteria, UA Moderate /hpf      Renal Epithelial Cells Present     Transitional Epithelial Cells Present    Urine culture [[id number]] Collected: 11/12/24 1244    Lab Status: In process Specimen: Urine, Straight Cath Updated: 11/12/24 1332    Lactic acid, plasma (w/reflex if result > 2.0) [[id number]]  (Abnormal) Collected: 11/12/24 1140    Lab Status: Final result Specimen: Blood from Arm, Left Updated: 11/12/24 1317     LACTIC ACID 4.8 mmol/L     Narrative:      Result may be elevated if tourniquet was used during collection.     Comprehensive metabolic panel [[id number]]  (Abnormal) Collected: 11/12/24 1140    Lab Status: Final result Specimen: Blood from Arm, Left Updated: 11/12/24 1314     Sodium 136 mmol/L      Potassium 3.4 mmol/L      Chloride 100 mmol/L      CO2 25 mmol/L      ANION GAP 11 mmol/L      BUN 15 mg/dL      Creatinine 1.48 mg/dL      Glucose 100 mg/dL      Calcium 10.1 mg/dL      AST 7 U/L      ALT 4 U/L      Alkaline Phosphatase 143 U/L      Total Protein 6.5 g/dL      Albumin 3.5 g/dL      Total Bilirubin 0.75 mg/dL      eGFR 33 ml/min/1.73sq m     Narrative:      National Kidney Disease Foundation guidelines for Chronic Kidney Disease (CKD):     Stage 1 with normal or high GFR (GFR > 90 mL/min/1.73 square meters)    Stage 2 Mild CKD (GFR = 60-89 mL/min/1.73 square meters)    Stage 3A Moderate CKD (GFR = 45-59 mL/min/1.73 square meters)    Stage 3B Moderate CKD (GFR = 30-44 mL/min/1.73 square meters)    Stage 4 Severe CKD (GFR = 15-29 mL/min/1.73 square meters)    Stage 5 End Stage CKD (GFR <15 mL/min/1.73 square meters)  Note: GFR calculation is accurate only with a steady state creatinine    Lipase [[id number]]  (Normal) Collected: 11/12/24 1140    Lab Status: Final result Specimen: Blood from Arm, Left Updated: 11/12/24 1314     Lipase 16 u/L     UA w Reflex to Microscopic w Reflex to Culture [[id number]]  (Abnormal) Collected: 11/12/24 1244    Lab Status: Final result Specimen: Urine, Straight Cath Updated: 11/12/24 1311     Color, UA Yellow     Clarity, UA Turbid     Specific Gravity, UA 1.008     pH, UA 6.5     Leukocytes, UA Large     Nitrite, UA Positive     Protein, UA Trace mg/dl      Glucose, UA Negative mg/dl      Ketones, UA Negative mg/dl      Urobilinogen, UA <2.0 mg/dl      Bilirubin, UA Negative     Occult Blood, UA Trace    HS Troponin 0hr (reflex protocol) [[id number]]  (Normal) Collected: 11/12/24 1140    Lab Status: Final result Specimen: Blood from Arm, Left Updated: 11/12/24 1245     hs TnI 0hr 9 ng/L      Protime-INR [[id number]]  (Abnormal) Collected: 11/12/24 1140    Lab Status: Final result Specimen: Blood from Arm, Left Updated: 11/12/24 1235     Protime 16.5 seconds      INR 1.32    Narrative:      INR Therapeutic Range    Indication                                             INR Range      Atrial Fibrillation                                               2.0-3.0  Hypercoagulable State                                    2.0.2.3  Left Ventricular Asist Device                            2.0-3.0  Mechanical Heart Valve                                  -    Aortic(with afib, MI, embolism, HF, LA enlargement,    and/or coagulopathy)                                     2.0-3.0 (2.5-3.5)     Mitral                                                             2.5-3.5  Prosthetic/Bioprosthetic Heart Valve               2.0-3.0  Venous thromboembolism (VTE: VT, PE        2.0-3.0    APTT [[id number]]  (Normal) Collected: 11/12/24 1140    Lab Status: Final result Specimen: Blood from Arm, Left Updated: 11/12/24 1235     PTT 28 seconds     CBC and differential [[id number]]  (Abnormal) Collected: 11/12/24 1140    Lab Status: Final result Specimen: Blood from Arm, Left Updated: 11/12/24 1217     WBC 15.05 Thousand/uL      RBC 4.43 Million/uL      Hemoglobin 12.5 g/dL      Hematocrit 38.5 %      MCV 87 fL      MCH 28.2 pg      MCHC 32.5 g/dL      RDW 13.8 %      MPV 9.8 fL      Platelets 321 Thousands/uL             CT chest abdomen pelvis w contrast   Final Interpretation by Yimi Lindsey MD (11/12 1536)         1. 7 mm right proximal ureteral calculus with moderate hydronephrosis and hydroureter. There is associated urothelial thickening and enhancement suggesting associated ureteritis   2. Left iliac and right common femoral vein DVT suspected. Recommend further characterization with DVT ultrasound.   3. Left peroneal defect may correspond to the wound. No fluid collection seen deep to this wound.         I  personally discussed this study with GRAY CRESPO on 11/12/2024 3:35 PM.               Workstation performed: [id number]         XR chest 1 view portable   Final Interpretation by Charlene King MD (11/12 1244)      Mild bibasilar opacity which could be due to atelectasis but pneumonia not excluded in the appropriate clinical setting.            Workstation performed: [id number]          VAS VENOUS DUPLEX - LOWER LIMB BILATERAL    (Results Pending)       ECG 12 Lead Documentation Only    Date/Time: 11/12/2024 2:52 PM    Performed by: Gray Crespo MD  Authorized by: Gray Crespo MD    Indications / Diagnosis:  Fever  ECG reviewed by me, the ED Provider: yes    Patient location:  ED  Interpretation:     Interpretation: normal    Rate:     ECG rate assessment: normal    Rhythm:     Rhythm: sinus rhythm    Ectopy:     Ectopy: none    QRS:     QRS axis:  Normal  Conduction:     Conduction: normal    ST segments:     ST segments:  Normal  T waves:     T waves: normal        ED Medication and Procedure Management   Prior to Admission Medications   Prescriptions Last Dose Informant Patient Reported? Taking?   Cholecalciferol 125 MCG (5000 UT) capsule 11/11/2024 Self Yes Yes   Sig: Take 5,000 Units by mouth daily   Multiple Vitamin tablet 11/11/2024  Yes Yes   Sig: Take 1 tablet by mouth daily   OXcarbazepine (TRILEPTAL) 150 mg tablet 11/11/2024  Yes Yes   Sig: Take 150 mg by mouth 2 (two) times a day   amLODIPine (NORVASC) 10 mg tablet 11/11/2024  Yes Yes   Sig: Take 10 mg by mouth daily   levothyroxine 75 mcg tablet 11/11/2024 Self Yes Yes   Sig: Take 75 mcg by mouth daily in the early morning   potassium chloride (Klor-Con M20) 20 mEq tablet 11/11/2024  Yes Yes   Sig: Take 20 mEq by mouth daily   venlafaxine (EFFEXOR-XR) 75 mg 24 hr capsule 11/11/2024 Self Yes Yes   Sig: Take 75 mg by mouth daily      Facility-Administered Medications: None     Current Discharge Medication List        CONTINUE these medications  which have NOT CHANGED    Details   amLODIPine (NORVASC) 10 mg tablet Take 10 mg by mouth daily      Cholecalciferol 125 MCG (5000 UT) capsule Take 5,000 Units by mouth daily      levothyroxine 75 mcg tablet Take 75 mcg by mouth daily in the early morning      Multiple Vitamin tablet Take 1 tablet by mouth daily      OXcarbazepine (TRILEPTAL) 150 mg tablet Take 150 mg by mouth 2 (two) times a day      potassium chloride (Klor-Con M20) 20 mEq tablet Take 20 mEq by mouth daily      venlafaxine (EFFEXOR-XR) 75 mg 24 hr capsule Take 75 mg by mouth daily           No discharge procedures on file.  ED SEPSIS DOCUMENTATION   Time reflects when diagnosis was documented in both MDM as applicable and the Disposition within this note       Time User Action Codes Description Comment    11/12/2024  2:50 PM Alam, Elie Add [R50.9] Fever     11/12/2024  2:52 PM Alam, Elie Add [N39.0] UTI (urinary tract infection)     11/12/2024  3:53 PM Alam, Elie Add [N20.1] Ureteric stone     11/12/2024  3:55 PM Alam, Elie Add [A41.9] Sepsis ([facility])     11/12/2024  4:33 PM Alam, Elie Add [I82.409] DVT (deep venous thrombosis) ([facility])     11/12/2024  5:28 PM Yimi Barksdale Add [E46] Malnutrition ([facility])            Initial Sepsis Screening       Row Name 11/12/24 0389                Is the patient's history suggestive of a new or worsening infection? Yes (Proceed)  -SA        Suspected source of infection urinary tract infection  -SA        Indicate SIRS criteria Hyperthemia > 38.3C (100.9F) OR Hypothermia <36C (96.8F);Leukocytosis (WBC > 77122 IJL) OR Leukopenia (WBC <4000 IJL) OR Bandemia (WBC >10% bands)  -SA        Are two or more of the above signs & symptoms of infection both present and new to the patient? Yes (Proceed)  -SA        Assess for evidence of organ dysfunction: Are any of the below criteria present within 6 hours of suspected infection and SIRS criteria that are NOT considered to be chronic conditions? Lactate >/equal 4.0  " -        Date of presentation of severe sepsis 11/12/24  -        Time of presentation of severe sepsis 1326  -SA        Date of presentation of septic shock --        Time of presentation of septic shock --        Fluid Resuscitation: --        Sepsis Note: Click \"NEXT\" below (NOT \"close\") to generate sepsis note based on above information. --                  User Key  (r) = Recorded By, (t) = Taken By, (c) = Cosigned By      Initials Name Provider Type     Elie Linares MD Physician                  Default Flowsheet Data (Last 720 Hours)       Sepsis Reassess       Row Name 11/12/24 1558 11/12/24 1557 11/12/24 1556 11/12/24 1505          Repeat Volume Status and Tissue Perfusion Assessment Performed    Date of Reassessment: 11/12/24  -SA 11/12/24  -SA 11/12/24  -SA 11/12/24  -     Time of Reassessment: 1558  -SA 1557  -SA -- --     Sepsis Reassessment Note: Click \"NEXT\" below (NOT \"close\") to generate sepsis reassessment note. YES (proceed by clicking \"NEXT\")  -SA -- -- YES (proceed by clicking \"NEXT\")  -     Repeat Volume Status and Tissue Perfusion Assessment Performed -- -- -- --               User Key  (r) = Recorded By, (t) = Taken By, (c) = Cosigned By      Initials Name Provider Type     Elie Linares MD Physician                     Elie Linares MD  11/12/24 2131    "

## 2024-11-12 NOTE — ASSESSMENT & PLAN NOTE
78 year old female with dementia, hypothyroidism, malnutrition was brought in here due to fever and pain.  Urinalysis concerning for UTI. Continue Ceftriaxone  Urology consulted, procedure planned for tomorrow. NPO after midnight  Await cultures

## 2024-11-13 ENCOUNTER — ANESTHESIA (INPATIENT)
Dept: PERIOP | Facility: HOSPITAL | Age: 79
DRG: 854 | End: 2024-11-13
Payer: COMMERCIAL

## 2024-11-13 ENCOUNTER — APPOINTMENT (INPATIENT)
Dept: RADIOLOGY | Facility: HOSPITAL | Age: 79
DRG: 854 | End: 2024-11-13
Payer: COMMERCIAL

## 2024-11-13 PROBLEM — I10 ESSENTIAL (PRIMARY) HYPERTENSION: Status: ACTIVE | Noted: 2024-11-13

## 2024-11-13 LAB
ALBUMIN SERPL BCG-MCNC: 2.8 G/DL (ref 3.5–5)
ALP SERPL-CCNC: 104 U/L (ref 34–104)
ALT SERPL W P-5'-P-CCNC: 6 U/L (ref 7–52)
ANION GAP SERPL CALCULATED.3IONS-SCNC: 7 MMOL/L (ref 4–13)
ANISOCYTOSIS BLD QL SMEAR: PRESENT
APTT PPP: 183 SECONDS (ref 23–34)
AST SERPL W P-5'-P-CCNC: 11 U/L (ref 13–39)
BASOPHILS # BLD MANUAL: 0 THOUSAND/UL (ref 0–0.1)
BASOPHILS NFR MAR MANUAL: 0 % (ref 0–1)
BILIRUB SERPL-MCNC: 0.29 MG/DL (ref 0.2–1)
BUN SERPL-MCNC: 18 MG/DL (ref 5–25)
BURR CELLS BLD QL SMEAR: PRESENT
CALCIUM ALBUM COR SERPL-MCNC: 9.9 MG/DL (ref 8.3–10.1)
CALCIUM SERPL-MCNC: 8.9 MG/DL (ref 8.4–10.2)
CHLORIDE SERPL-SCNC: 108 MMOL/L (ref 96–108)
CO2 SERPL-SCNC: 26 MMOL/L (ref 21–32)
CREAT SERPL-MCNC: 0.87 MG/DL (ref 0.6–1.3)
DACRYOCYTES BLD QL SMEAR: PRESENT
EOSINOPHIL # BLD MANUAL: 0.28 THOUSAND/UL (ref 0–0.4)
EOSINOPHIL NFR BLD MANUAL: 1 % (ref 0–6)
ERYTHROCYTE [DISTWIDTH] IN BLOOD BY AUTOMATED COUNT: 14.6 % (ref 11.6–15.1)
GFR SERPL CREATININE-BSD FRML MDRD: 63 ML/MIN/1.73SQ M
GLUCOSE SERPL-MCNC: 84 MG/DL (ref 65–140)
HCT VFR BLD AUTO: 33.3 % (ref 34.8–46.1)
HGB BLD-MCNC: 11 G/DL (ref 11.5–15.4)
LG PLATELETS BLD QL SMEAR: PRESENT
LYMPHOCYTES # BLD AUTO: 1.7 THOUSAND/UL (ref 0.6–4.47)
LYMPHOCYTES # BLD AUTO: 5 % (ref 14–44)
MAGNESIUM SERPL-MCNC: 1.7 MG/DL (ref 1.9–2.7)
MCH RBC QN AUTO: 28.9 PG (ref 26.8–34.3)
MCHC RBC AUTO-ENTMCNC: 33 G/DL (ref 31.4–37.4)
MCV RBC AUTO: 88 FL (ref 82–98)
MONOCYTES # BLD AUTO: 1.42 THOUSAND/UL (ref 0–1.22)
MONOCYTES NFR BLD: 5 % (ref 4–12)
NEUTROPHILS # BLD MANUAL: 24.93 THOUSAND/UL (ref 1.85–7.62)
NEUTS BAND NFR BLD MANUAL: 15 % (ref 0–8)
NEUTS SEG NFR BLD AUTO: 73 % (ref 43–75)
PLATELET # BLD AUTO: 264 THOUSANDS/UL (ref 149–390)
PLATELET BLD QL SMEAR: ADEQUATE
PMV BLD AUTO: 9.7 FL (ref 8.9–12.7)
POTASSIUM SERPL-SCNC: 3.4 MMOL/L (ref 3.5–5.3)
PROT SERPL-MCNC: 5.4 G/DL (ref 6.4–8.4)
RBC # BLD AUTO: 3.8 MILLION/UL (ref 3.81–5.12)
RBC MORPH BLD: PRESENT
SCHISTOCYTES BLD QL SMEAR: PRESENT
SODIUM SERPL-SCNC: 141 MMOL/L (ref 135–147)
VARIANT LYMPHS # BLD AUTO: 1 %
WBC # BLD AUTO: 28.33 THOUSAND/UL (ref 4.31–10.16)

## 2024-11-13 PROCEDURE — 52332 CYSTOSCOPY AND TREATMENT: CPT | Performed by: UROLOGY

## 2024-11-13 PROCEDURE — 97167 OT EVAL HIGH COMPLEX 60 MIN: CPT

## 2024-11-13 PROCEDURE — 85730 THROMBOPLASTIN TIME PARTIAL: CPT | Performed by: STUDENT IN AN ORGANIZED HEALTH CARE EDUCATION/TRAINING PROGRAM

## 2024-11-13 PROCEDURE — 97163 PT EVAL HIGH COMPLEX 45 MIN: CPT

## 2024-11-13 PROCEDURE — 80053 COMPREHEN METABOLIC PANEL: CPT | Performed by: STUDENT IN AN ORGANIZED HEALTH CARE EDUCATION/TRAINING PROGRAM

## 2024-11-13 PROCEDURE — 83735 ASSAY OF MAGNESIUM: CPT | Performed by: STUDENT IN AN ORGANIZED HEALTH CARE EDUCATION/TRAINING PROGRAM

## 2024-11-13 PROCEDURE — C1769 GUIDE WIRE: HCPCS | Performed by: UROLOGY

## 2024-11-13 PROCEDURE — 0T768DZ DILATION OF RIGHT URETER WITH INTRALUMINAL DEVICE, VIA NATURAL OR ARTIFICIAL OPENING ENDOSCOPIC: ICD-10-PCS | Performed by: UROLOGY

## 2024-11-13 PROCEDURE — 99223 1ST HOSP IP/OBS HIGH 75: CPT | Performed by: UROLOGY

## 2024-11-13 PROCEDURE — C2617 STENT, NON-COR, TEM W/O DEL: HCPCS | Performed by: UROLOGY

## 2024-11-13 PROCEDURE — 99232 SBSQ HOSP IP/OBS MODERATE 35: CPT | Performed by: INTERNAL MEDICINE

## 2024-11-13 PROCEDURE — BT1F1ZZ FLUOROSCOPY OF LEFT KIDNEY, URETER AND BLADDER USING LOW OSMOLAR CONTRAST: ICD-10-PCS | Performed by: UROLOGY

## 2024-11-13 PROCEDURE — 85027 COMPLETE CBC AUTOMATED: CPT | Performed by: STUDENT IN AN ORGANIZED HEALTH CARE EDUCATION/TRAINING PROGRAM

## 2024-11-13 PROCEDURE — 74420 UROGRAPHY RTRGR +-KUB: CPT

## 2024-11-13 PROCEDURE — 85007 BL SMEAR W/DIFF WBC COUNT: CPT | Performed by: STUDENT IN AN ORGANIZED HEALTH CARE EDUCATION/TRAINING PROGRAM

## 2024-11-13 DEVICE — VARIABLE LENGTH INJECTION STENT SET
Type: IMPLANTABLE DEVICE | Site: URETER | Status: FUNCTIONAL
Brand: CONTOUR VL™ INJECTION STENT SET

## 2024-11-13 RX ORDER — SODIUM CHLORIDE 9 MG/ML
INJECTION, SOLUTION INTRAVENOUS AS NEEDED
Status: DISCONTINUED | OUTPATIENT
Start: 2024-11-13 | End: 2024-11-13 | Stop reason: HOSPADM

## 2024-11-13 RX ORDER — ONDANSETRON 2 MG/ML
4 INJECTION INTRAMUSCULAR; INTRAVENOUS ONCE AS NEEDED
Status: DISCONTINUED | OUTPATIENT
Start: 2024-11-13 | End: 2024-11-13 | Stop reason: HOSPADM

## 2024-11-13 RX ORDER — LIDOCAINE HYDROCHLORIDE 10 MG/ML
INJECTION, SOLUTION EPIDURAL; INFILTRATION; INTRACAUDAL; PERINEURAL AS NEEDED
Status: DISCONTINUED | OUTPATIENT
Start: 2024-11-13 | End: 2024-11-13

## 2024-11-13 RX ORDER — FENTANYL CITRATE/PF 50 MCG/ML
25 SYRINGE (ML) INJECTION
Status: DISCONTINUED | OUTPATIENT
Start: 2024-11-13 | End: 2024-11-13 | Stop reason: HOSPADM

## 2024-11-13 RX ORDER — FENTANYL CITRATE 50 UG/ML
INJECTION, SOLUTION INTRAMUSCULAR; INTRAVENOUS AS NEEDED
Status: DISCONTINUED | OUTPATIENT
Start: 2024-11-13 | End: 2024-11-13

## 2024-11-13 RX ORDER — SODIUM CHLORIDE 9 MG/ML
125 INJECTION, SOLUTION INTRAVENOUS CONTINUOUS
Status: CANCELLED | OUTPATIENT
Start: 2024-11-13

## 2024-11-13 RX ORDER — ACETAMINOPHEN 325 MG/1
975 TABLET ORAL EVERY 8 HOURS
Status: DISCONTINUED | OUTPATIENT
Start: 2024-11-13 | End: 2024-11-15 | Stop reason: HOSPADM

## 2024-11-13 RX ORDER — PROPOFOL 10 MG/ML
INJECTION, EMULSION INTRAVENOUS AS NEEDED
Status: DISCONTINUED | OUTPATIENT
Start: 2024-11-13 | End: 2024-11-13

## 2024-11-13 RX ORDER — ONDANSETRON 2 MG/ML
INJECTION INTRAMUSCULAR; INTRAVENOUS AS NEEDED
Status: DISCONTINUED | OUTPATIENT
Start: 2024-11-13 | End: 2024-11-13

## 2024-11-13 RX ORDER — LIDOCAINE 50 MG/G
1 PATCH TOPICAL DAILY
Status: DISCONTINUED | OUTPATIENT
Start: 2024-11-13 | End: 2024-11-15 | Stop reason: HOSPADM

## 2024-11-13 RX ORDER — SODIUM CHLORIDE 9 MG/ML
INJECTION, SOLUTION INTRAVENOUS CONTINUOUS PRN
Status: DISCONTINUED | OUTPATIENT
Start: 2024-11-13 | End: 2024-11-13

## 2024-11-13 RX ADMIN — ONDANSETRON 4 MG: 2 INJECTION INTRAMUSCULAR; INTRAVENOUS at 15:05

## 2024-11-13 RX ADMIN — SODIUM CHLORIDE: 0.9 INJECTION, SOLUTION INTRAVENOUS at 14:46

## 2024-11-13 RX ADMIN — AMLODIPINE BESYLATE 10 MG: 10 TABLET ORAL at 09:01

## 2024-11-13 RX ADMIN — SODIUM CHLORIDE: 0.9 INJECTION, SOLUTION INTRAVENOUS at 15:34

## 2024-11-13 RX ADMIN — APIXABAN 10 MG: 5 TABLET, FILM COATED ORAL at 17:52

## 2024-11-13 RX ADMIN — LEVOTHYROXINE SODIUM 75 MCG: 75 TABLET ORAL at 05:34

## 2024-11-13 RX ADMIN — FENTANYL CITRATE 50 MCG: 50 INJECTION INTRAMUSCULAR; INTRAVENOUS at 14:48

## 2024-11-13 RX ADMIN — LIDOCAINE 1 PATCH: 50 PATCH TOPICAL at 09:01

## 2024-11-13 RX ADMIN — ACETAMINOPHEN 325MG 975 MG: 325 TABLET ORAL at 21:41

## 2024-11-13 RX ADMIN — ACETAMINOPHEN 325MG 975 MG: 325 TABLET ORAL at 06:27

## 2024-11-13 RX ADMIN — LIDOCAINE HYDROCHLORIDE 100 MG: 10 INJECTION, SOLUTION EPIDURAL; INFILTRATION; INTRACAUDAL; PERINEURAL at 14:48

## 2024-11-13 RX ADMIN — PROPOFOL 100 MG: 10 INJECTION, EMULSION INTRAVENOUS at 14:49

## 2024-11-13 RX ADMIN — OXCARBAZEPINE 150 MG: 150 TABLET, FILM COATED ORAL at 09:01

## 2024-11-13 RX ADMIN — FENTANYL CITRATE 50 MCG: 50 INJECTION INTRAMUSCULAR; INTRAVENOUS at 14:45

## 2024-11-13 RX ADMIN — OXCARBAZEPINE 150 MG: 150 TABLET, FILM COATED ORAL at 17:52

## 2024-11-13 RX ADMIN — PROPOFOL 40 MG: 10 INJECTION, EMULSION INTRAVENOUS at 14:48

## 2024-11-13 RX ADMIN — CEFTRIAXONE 1000 MG: 1 INJECTION, POWDER, FOR SOLUTION INTRAMUSCULAR; INTRAVENOUS at 00:35

## 2024-11-13 RX ADMIN — VENLAFAXINE HYDROCHLORIDE 75 MG: 75 CAPSULE, EXTENDED RELEASE ORAL at 09:01

## 2024-11-13 NOTE — ASSESSMENT & PLAN NOTE
Vas venous duplex 11/12  (LE) -- LEFT (Evidence suggestive of Acute deep vein thrombosis noted in the external iliac,common femoral, femoral, and popliteal veins.) RIGHT (Evidence suggestive of Acute deep vein thrombosis noted in the common femoral, deep femoral and posterior tibial veins.   Initiated on Heparin drip   PTT elevated 183     Plan:  Proceed with surgery despite elevated PTT - anticipate bleeding post-op

## 2024-11-13 NOTE — ASSESSMENT & PLAN NOTE
Presented to [facility] ED 11/12 with fever from her facility   SIRS met at presentation --  WBC 15, temperature 101.9 °F, lactic acid 4.8  Initiated on ceftriaxone   CT a/p shows incidental finding of 7mm obstructing right ureteral stone causing moderate hydronephrosis   Creatinine remained stable  Denied pain   WBC continues to increase 15 (at presentation) --> 28 today   UA positive for infection   Case request placed for right ureteral stent placement this afternoon    Plan:  Attempting to obtain surgical consent from the patients son (Dennis De La Cruz)   Continue IV antibiotics per SLIM   Maintain NPO status   Proceed with surgical intervention this afternoon   Continue to monitor vitals closely for any sign of deterioration   She will require second stage stone procedure for definitive management once she is infection free

## 2024-11-13 NOTE — PROGRESS NOTES
Progress Note - Hospitalist   Name: Tiana De La Cruz 78 y.o. female I MRN: [id number]  Unit/Bed#: [place] [address] -01 I Date of Admission: 11/12/2024   Date of Service: 11/13/2024 I Hospital Day: 1    Assessment & Plan  Hydronephrosis with ureteral calculus  History of hypothyroidism dementia and seizure disorder who presents to the hospital with fever  Sepsis present admission secondary to gram-negative paul bacteremia urinary source  Continue ceftriaxone.  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    Mobility:   Basic Mobility Inpatient Raw Score: 6  JH-HLM Goal: 2: Bed activities/Dependent transfer  JH-HLM Achieved: 1: Laying in bed  JH-HLM Goal NOT achieved. Continue with multidisciplinary rounding and encourage appropriate mobility to improve upon JH-HLM goals.    Patient Centered Rounds: I have performed bedside rounds with nursing staff today.  Discussions with Specialists or Other Care Team Provider: Case management and urology    Education and Discussions with Family / Patient: Updated  (son) via phone.    Current Length of Stay: 1 day(s)  Current Patient Status: Inpatient   Certification Statement: The patient will continue to require additional inpatient hospital stay due to bacteremia  Discharge Plan: Anticipate discharge in  48-72 hrs to previous SNF    Code Status: Level 1 - Full Code    Subjective   Patient seen and examined.  No complaints, pleasantly confused    Objective   Vitals:   Temp (24hrs), Av.2 °F (37.3 °C), Min:97.6 °F (36.4 °C), Max:101.9 °F (38.8 °C)    Temp:  [97.6 °F (36.4 °C)-101.9 °F (38.8 °C)] 99.2 °F (37.3 °C)  HR:  [] 96  Resp:  [18-24] 18  BP: (102-158)/(59-92) 107/74  SpO2:  [92 %-96 %] 96 %  Body mass index is 25 kg/m².     Input and Output Summary (last 24 hours):     Intake/Output Summary (Last 24 hours) at 2024 1151  Last data filed at 2024 0530  Gross per 24 hour   Intake 2520 ml   Output 150 ml   Net 2370 ml       Physical Exam  Vitals reviewed.   Constitutional:       General: She is not in acute distress.     Appearance: Normal appearance.   HENT:      Head: Atraumatic.   Cardiovascular:      Rate and Rhythm: Regular rhythm.   Pulmonary:      Breath sounds: Decreased breath sounds present. No wheezing.   Abdominal:      General: Bowel sounds are normal.      Palpations: Abdomen is soft.      Tenderness: There is no guarding or rebound.   Skin:     General: Skin is warm.   Neurological:      General: No focal deficit present.      Mental Status: She is alert.   Psychiatric:         Mood and Affect: Mood normal.       Lines/Drains:  Invasive Devices       Peripheral Intravenous Line  Duration             Peripheral IV 24 Distal;Left;Ventral (anterior) Forearm 1 day    Peripheral IV 24 Left;Proximal;Ventral (anterior) Forearm 1 day                            Lab Results: I have reviewed the following results:   Results from last 7 days   Lab Units 24  0655 24  1140   WBC Thousand/uL 28.33* 15.05*   HEMOGLOBIN g/dL 11.0* 12.5   PLATELETS Thousands/uL 264 321   MCV fL 88 87   TOTAL NEUT ABS Thousand/uL 24.93* 14.60*   BANDS PCT % 15* 9*   INR   --  1.32*     Results from last 7 days   Lab Units 24  0655 24  1140   SODIUM mmol/L 141 136   POTASSIUM  mmol/L 3.4* 3.4*   CHLORIDE mmol/L 108 100   CO2 mmol/L 26 25   ANION GAP mmol/L 7 11   BUN mg/dL 18 15   CREATININE mg/dL 0.87 1.48*   CALCIUM mg/dL 8.9 10.1   ALBUMIN g/dL 2.8* 3.5   TOTAL BILIRUBIN mg/dL 0.29 0.75   ALK PHOS U/L 104 143*   ALT U/L 6* 4*   AST U/L 11* 7*   EGFR ml/min/1.73sq m 63 33   GLUCOSE RANDOM mg/dL 84 100     Results from last 7 days   Lab Units 11/13/24  0655   MAGNESIUM mg/dL 1.7*         Results from last 7 days   Lab Units 11/12/24  1337 11/12/24  1140   HS TNI 0HR ng/L  --  9   HS TNI 2HR ng/L <2  --           Results from last 7 days   Lab Units 11/12/24  1337 11/12/24  1140   LACTIC ACID mmol/L 0.7 4.8*                   Recent Cultures (last 7 days):   Results from last 7 days   Lab Units 11/12/24  1255 11/12/24  1248   GRAM STAIN RESULT  Gram negative rods* Gram negative rods*       Imaging:  Reviewed radiology reports from this admission including:  CT chest abdomen pelvis w contrast  Result Date: 11/12/2024  Impression: 1. 7 mm right proximal ureteral calculus with moderate hydronephrosis and hydroureter. There is associated urothelial thickening and enhancement suggesting associated ureteritis 2. Left iliac and right common femoral vein DVT suspected. Recommend further characterization with DVT ultrasound. 3. Left peroneal defect may correspond to the wound. No fluid collection seen deep to this wound. I personally discussed this study with GRAY CRESPO on 11/12/2024 3:35 PM. Workstation performed: [id number]     XR chest 1 view portable  Result Date: 11/12/2024  Impression: Mild bibasilar opacity which could be due to atelectasis but pneumonia not excluded in the appropriate clinical setting. Workstation performed: [id number]       Last 24 Hours Medication List:     Current Facility-Administered Medications:     acetaminophen (TYLENOL) tablet 975 mg, Q8H    amLODIPine (NORVASC) tablet 10 mg, Daily    ceftriaxone (ROCEPHIN) 1 g/50 mL in dextrose IVPB, Q24H, Last Rate: 1,000 mg  (11/13/24 0035)    heparin (porcine) 25,000 units in 0.45% NaCl 250 mL infusion (premix), Titrated, Last Rate: 12 Units/kg/hr (11/13/24 0926)    heparin (porcine) injection 2,800 Units, Q6H PRN    heparin (porcine) injection 5,600 Units, Q6H PRN    levothyroxine tablet 75 mcg, Early Morning    lidocaine (LIDODERM) 5 % patch 1 patch, Daily    ondansetron (ZOFRAN) injection 4 mg, Once PRN    OXcarbazepine (TRILEPTAL) tablet 150 mg, BID    venlafaxine (EFFEXOR-XR) 24 hr capsule 75 mg, Daily    Administrative Statements   Today, Patient Was Seen By: Soto Da Silva, DO  I have spent a total time of 45 minutes in caring for this patient on the day of the visit/encounter including Diagnostic results, Patient and family education, Counseling / Coordination of care, Documenting in the medical record, Reviewing / ordering tests, medicine, procedures  , Obtaining or reviewing history  , and Communicating with other healthcare professionals .    **Please Note: This note may have been constructed using a voice recognition system.**

## 2024-11-13 NOTE — UTILIZATION REVIEW
Initial Clinical Review    Admission: Date/Time/Statement:   Admission Orders (From admission, onward)       Ordered        11/12/24 1556  INPATIENT ADMISSION  Once                          Orders Placed This Encounter   Procedures    INPATIENT ADMISSION     Standing Status:   Standing     Number of Occurrences:   1     Level of Care:   Med Surg [16]     Estimated length of stay:   More than 2 Midnights     Certification:   I certify that inpatient services are medically necessary for this patient for a duration of greater than two midnights. See H&P and MD Progress Notes for additional information about the patient's course of treatment.     ED Arrival Information       Expected   -    Arrival   11/12/2024 11:08    Acuity   Emergent              Means of arrival   Ambulance    Escorted by   [facility] ([facility])    Service   Hospitalist    Admission type   Emergency              Arrival complaint   Fever             Chief Complaint   Patient presents with    Fever     Brought in via ems from [facility]. Per EMS facility stated patient had fever this morning and they administered Tylenol. Patient with hx of dementia oriented to self. Wound on left inner buttocks noted.        Initial Presentation: 78 y.o. female presents to the ED via EMS from nursing facility with c/o fever, elevated BP, c/o leg pain, generalized abd pain.  PMH: dementia, depression, h/o seizures, malnutrition, basal cell CA vulva and gluteal fold, HLD, Hypothyroidism.  In the ED VS stable, no documented c/o pain, met sepsis criteria.  Labs - leukocytosis w/ bands, elevated creat, alk phos,lactic acidosis, abnormal UA, low K 3.4.  ECG - ST.  Imaging - 7 mm right proximal ureteral calculus with moderate hydronephrosis and hydroureter poss ureteritis; mild bibasilar opacity c/w atelectasis vs PNA. Left iliac and right common femoral vein DVT suspected; L peroneal defect r/t wound.  BLE venous duplex - RLE + DVT common femoral , dep femoral  and post tib veins, LLE acute DVT external iliac, common femoral, femoral, and popliteal veins.  Treated with IV fluids x 2.25L, IV Tylenol, IV antibiotics, Heparin bolus and drip. On exam ill-appearing, + buttock wound POA.  Admitted to INPATIENT status with   Hydronephrosis and urteral calculi, DVT BLE, seizure disorder, malnutrition, perineal wound - PLAN: IV antibiotics, urology consult, NPO p MN for poss intervention, blood and urine cultures, Heparin drip, nutrition consult, continue Trileptal and Venlafaxine, wound care consult.          Anticipated Length of Stay/Certification Statement: Patient will be admitted on an inpatient basis with an anticipated length of stay of greater than 2 midnights secondary to iv hydration, iv antibiotics, urology evaluation and intervention .     Date: 11/13   Day 2:   Hydronephrosis and urteral calculi, DVT BLE, seizure disorder, malnutrition, perineal wound - urology consult complete, worked with PT and OT  - will need SNF rehab.    Will go to the OR today.  Aferile today.  Has increased leukocytosis, + blood cultures for GNR, lactic acidosis resolved, K remains 3.4.  she is on IV fluids, IV antibiotics, Heparin drip.     11/13 Urology Consult - Hydronephrosis with ureteral calculus - meets SIRS, on IV antibiotics, no pain, leukocytosis increased today.  UTI.  Continue IV antibiotics, NPO, OR this PM, will require 2nd stage stone procedure for definitive mgmt when infection free. On exam is shaky, reporting vision changes, dizziness + Bacteremia w/ GNR.      +++++++++++++++  11/13 OPERATIVE NOTE  reop Diagnosis:  Right ureteral stone with hydronephrosis and sepsis  Postoperative diagnosis:  Right ureteral stone with hydronephrosis and sepsis      Procedure(s):  Right - CYSTOSCOPY RETROGRADE PYELOGRAM WITH INSERTION STENT URETERAL      Anesthesia Type:  General  +++++++++++++++++    ED Treatment-Medication Administration from 11/12/2024 1108 to 11/12/2024 4192          Date/Time Order Dose Route Action     11/12/2024 1228 sodium chloride 0.9 % bolus 1,000 mL 1,000 mL Intravenous New Bag     11/12/2024 1308 acetaminophen (Ofirmev) injection 1,000 mg 1,000 mg Intravenous New Bag     11/12/2024 1330 cefepime (MAXIPIME) 2 g/50 mL dextrose IVPB 2,000 mg Intravenous New Bag     11/12/2024 1329 sodium chloride 0.9 % bolus 1,250 mL 1,250 mL Intravenous New Bag     11/12/2024 1412 iohexol (OMNIPAQUE) 350 MG/ML injection (SINGLE-DOSE) 100 mL 100 mL Intravenous Given     11/12/2024 1659 heparin (porcine) injection 5,600 Units 5,600 Units Intravenous Given     11/12/2024 1705 heparin (porcine) 25,000 units in 0.45% NaCl 250 mL infusion (premix) 18 Units/kg/hr Intravenous New Bag            Scheduled Medications:  acetaminophen, 975 mg, Oral, Q8H  amLODIPine, 10 mg, Oral, Daily  cefTRIAXone, 1,000 mg, Intravenous, Q24H  levothyroxine, 75 mcg, Oral, Early Morning  lidocaine, 1 patch, Topical, Daily  OXcarbazepine, 150 mg, Oral, BID  venlafaxine, 75 mg, Oral, Daily      Continuous IV Infusions:  heparin (porcine), 3-30 Units/kg/hr (Order-Specific), Intravenous, Titrated      PRN Meds:  heparin (porcine), 2,800 Units, Intravenous, Q6H PRN  heparin (porcine), 5,600 Units, Intravenous, Q6H PRN  ondansetron, 4 mg, Intravenous, Once PRN      ED Triage Vitals   Temperature Pulse Respirations Blood Pressure SpO2 Pain Score   11/12/24 1132 11/12/24 1132 11/12/24 1132 11/12/24 1132 11/12/24 1132 11/12/24 1400   98.9 °F (37.2 °C) 80 19 123/60 95 % No Pain     Weight (last 2 days)       Date/Time Weight    11/12/24 22:54:51 72.4 (159.61)    11/12/24 1349 72.4 (159.61)    11/12/24 1132 72.4 (159.61)            Vital Signs (last 3 days)       Date/Time Temp Pulse Resp BP MAP (mmHg) SpO2 O2 Device Patient Position - Orthostatic VS [place] Coma Scale Score Pain    11/13/24 09:54:18 -- 96 -- 107/74 85 96 % -- -- -- --    11/13/24 09:46:32 -- 93 -- 127/73 91 95 % -- -- -- --    11/13/24 07:25:38 99.2 °F (37.3  °C) 94 18 158/66 97 94 % None (Room air) Lying -- --    11/13/24 0627 -- -- -- -- -- -- -- -- -- 10 - Worst Possible Pain    11/13/24 06:10:17 99.8 °F (37.7 °C) 86 -- 137/62 87 94 % -- -- -- --    11/12/24 22:54:51 97.6 °F (36.4 °C) 92 19 102/71 81 96 % None (Room air) Lying -- --    11/12/24 2240 -- -- -- -- -- -- -- -- -- No Pain    11/12/24 2200 -- -- -- -- -- -- -- -- 14 No Pain    11/12/24 21:17:25 98.4 °F (36.9 °C) 85 20 132/92 105 92 % -- -- -- --    11/12/24 1756 -- -- -- -- -- 92 % None (Room air) -- 13 No Pain    11/12/24 17:37:40 97.9 °F (36.6 °C) 92 -- 141/72 95 94 % -- -- -- --    11/12/24 1700 -- 87 18 131/59 85 93 % None (Room air) Lying -- --    11/12/24 1551 99.7 °F (37.6 °C) -- -- -- -- -- -- -- -- --    11/12/24 1530 -- 93 18 139/64 92 93 % -- -- -- --    11/12/24 1515 -- 98 18 140/67 96 96 % None (Room air) Lying -- --    11/12/24 1500 -- 96 18 143/68 98 96 % None (Room air) Lying -- --    11/12/24 1400 -- 94 24 140/73 99 94 % None (Room air) Lying -- No Pain    11/12/24 1331 -- 96 18 129/63 -- 93 % None (Room air) Lying -- --    11/12/24 1238 101.9 °F (38.8 °C)  -- -- -- -- -- -- -- -- --    11/12/24 1230 -- 116 18 143/73 97 95 % None (Room air) Lying -- --    11/12/24 1214 -- -- -- -- -- -- -- -- 13 --    11/12/24 1132 98.9 °F (37.2 °C) 80 19 123/60 -- 95 % None (Room air) Lying -- --              Pertinent Labs/Diagnostic Test Results:   Radiology:   VAS VENOUS DUPLEX - LOWER LIMB BILATERAL   Final Interpretation by Rudi Castro MD (11/12 2513)   RIGHT LOWER LIMB:  Evidence suggestive of Acute deep vein thrombosis noted in the common femoral,  deep femoral and posterior tibial veins.  No evidence of superficial thrombophlebitis noted.  Doppler evaluation shows a normal response to augmentation maneuvers.  Popliteal, posterior tibial and anterior tibial arterial Doppler waveforms are  biphasic.     LEFT LOWER LIMB:  Evidence suggestive of Acute deep vein thrombosis noted in the external  iliac,  common femoral, femoral, and popliteal veins.  No evidence of superficial thrombophlebitis noted.  Doppler evaluation shows a normal response to augmentation maneuvers.  Popliteal, posterior tibial and anterior tibial arterial Doppler waveforms are  biphasic.  Technically difficult/limited study. Some segments may be poorly visualized on  today's exam.      CT chest abdomen pelvis w contrast   Final Interpretation by Yimi Lindsey MD (11/12 8346)         1. 7 mm right proximal ureteral calculus with moderate hydronephrosis and hydroureter. There is associated urothelial thickening and enhancement suggesting associated ureteritis   2. Left iliac and right common femoral vein DVT suspected. Recommend further characterization with DVT ultrasound.   3. Left peroneal defect may correspond to the wound. No fluid collection seen deep to this wound.      XR chest 1 view portable   Final Interpretation by Charlene King MD (11/12 1479)      Mild bibasilar opacity which could be due to atelectasis but pneumonia not excluded in the appropriate clinical setting.     Cardiology:  ECG 12 lead   Final Result by Leny Mcclendon MD (11/12 9427)   Normal sinus rhythm with sinus arrhythmia   Left axis deviation   Low voltage QRS   Inferior infarct (cited on or before 12-Nov-2024)   Abnormal ECG   When compared with ECG of 12-Nov-2024 12:52,   No significant change was found   Confirmed by Leny Mcclendon ([id number]) on 11/12/2024 4:57:51 PM      ECG 12 lead   Final Result by Yimi Carvajal MD (11/12 9755)   Sinus tachycardia   Left axis deviation   Low voltage QRS   Inferior infarct , age undetermined   Abnormal ECG   No previous ECGs available   Confirmed by Yimi Carvajal ([id number]) on 11/12/2024 1:03:37 PM        GI:  No orders to display           Results from last 7 days   Lab Units 11/13/24  0655 11/12/24  1140   WBC Thousand/uL 28.33* 15.05*   HEMOGLOBIN g/dL 11.0* 12.5   HEMATOCRIT % 33.3* 38.5   PLATELETS  Thousands/uL 264 321   BANDS PCT % 15* 9*         Results from last 7 days   Lab Units 11/13/24  0655 11/12/24  1140   SODIUM mmol/L 141 136   POTASSIUM mmol/L 3.4* 3.4*   CHLORIDE mmol/L 108 100   CO2 mmol/L 26 25   ANION GAP mmol/L 7 11   BUN mg/dL 18 15   CREATININE mg/dL 0.87 1.48*   EGFR ml/min/1.73sq m 63 33   CALCIUM mg/dL 8.9 10.1   MAGNESIUM mg/dL 1.7*  --      Results from last 7 days   Lab Units 11/13/24  0655 11/12/24  1140   AST U/L 11* 7*   ALT U/L 6* 4*   ALK PHOS U/L 104 143*   TOTAL PROTEIN g/dL 5.4* 6.5   ALBUMIN g/dL 2.8* 3.5   TOTAL BILIRUBIN mg/dL 0.29 0.75         Results from last 7 days   Lab Units 11/13/24  0655 11/12/24  1140   GLUCOSE RANDOM mg/dL 84 100           Results from last 7 days   Lab Units 11/12/24  1337 11/12/24  1140   HS TNI 0HR ng/L  --  9   HS TNI 2HR ng/L <2  --    HSTNI D2 ng/L <-7  --          Results from last 7 days   Lab Units 11/13/24  0655 11/12/24  2325 11/12/24  1140   PROTIME seconds  --   --  16.5*   INR   --   --  1.32*   PTT seconds 183* >210* 28             Results from last 7 days   Lab Units 11/12/24  1337 11/12/24  1140   LACTIC ACID mmol/L 0.7 4.8*           Results from last 7 days   Lab Units 11/12/24  1140   LIPASE u/L 16           Results from last 7 days   Lab Units 11/12/24  1244   CLARITY UA  Turbid   COLOR UA  Yellow   SPEC GRAV UA  1.008   PH UA  6.5   GLUCOSE UA mg/dl Negative   KETONES UA mg/dl Negative   BLOOD UA  Trace*   PROTEIN UA mg/dl Trace*   NITRITE UA  Positive*   BILIRUBIN UA  Negative   UROBILINOGEN UA (BE) mg/dl <2.0   LEUKOCYTES UA  Large*   WBC UA /hpf Innumerable*   RBC UA /hpf 4-10*   BACTERIA UA /hpf Moderate*   EPITHELIAL CELLS WET PREP /hpf Occasional       Results from last 7 days   Lab Units 11/12/24  1255 11/12/24  1248   GRAM STAIN RESULT  Gram negative rods* Gram negative rods*     Past Medical History:   Diagnosis Date    Anxiety     Colon polyps     Depression     Fibromyalgia     Hypothyroid     Lumbar spondylosis      Osteoarthritis      Present on Admission:   Hydronephrosis with ureteral calculus   DVT (deep venous thrombosis) (HCC)   Seizures (HCC)   Severe protein-calorie malnutrition (HCC)   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 female    Network Utilization Review Department  ATTENTION: Please call with any questions or concerns to [phone] and carefully listen to the prompts so that you are directed to the right person. All voicemails are confidential.   For Discharge needs, contact Care Management DC Support Team at [phone] opt. 2  Send all requests for admission clinical reviews, approved or denied determinations and any other requests to dedicated fax number below belonging to the campus where the patient is receiving treatment. List of dedicated fax numbers for the Facilities:  FACILITY NAME UR FAX NUMBER   ADMISSION DENIALS (Administrative/Medical Necessity) [phone]   DISCHARGE SUPPORT TEAM (NETWORK) [phone]   PARENT CHILD HEALTH (Maternity/NICU/Pediatrics) [phone]   [facility] [phone]   [facility] [phone]   [facility] [phone]   [facility] [phone]   [facility] [phone]   [facility] [phone]   [facility] [phone]   [facility] [phone]   [facility] [phone]   [facility] [phone]   [facility] [phone]   [facility] [phone]

## 2024-11-13 NOTE — PHYSICAL THERAPY NOTE
PHYSICAL THERAPY EVALUATION          Patient Name: Tiana De La Cruz  Today's Date: 11/13/2024 11/13/24 0936   PT Last Visit   PT Visit Date 11/13/24   Note Type   Note type Evaluation   Pain Assessment   Pain Assessment Tool FLACC   Pain Rating: FLACC (Rest) - Face 0   Pain Rating: FLACC (Rest) - Legs 0   Pain Rating: FLACC (Rest) - Activity 0   Pain Rating: FLACC (Rest) - Cry 0   Pain Rating: FLACC (Rest) - Consolability 0   Score: FLACC (Rest) 0   Pain Rating: FLACC (Activity) - Face 1   Pain Rating: FLACC (Activity) - Legs 0   Pain Rating: FLACC (Activity) - Activity 0   Pain Rating: FLACC (Activity) - Cry 1   Pain Rating: FLACC (Activity) - Consolability 1   Score: FLACC (Activity) 3   Restrictions/Precautions   Other Precautions Cognitive;Chair Alarm;Bed Alarm;Fall Risk;Pain   Home Living   Type of Home SNF   Additional Comments per chart review from [facility]   Prior Function   Level of [place] Needs assistance with ADLs;Needs assistance with functional mobility;Needs assistance with IADLS   Lives With Facility staff   Comments assumed level of care. unclear mobility status at SNF however per patient report Ax2 to transfer OOB   General   Additional Pertinent History pt admitted 11/12/24 for hydronephrosis w ureteral calculus. activity as tolerated order. PMHx significant for seizures, anxiety, FM, OA, depression, dementia   Cognition   Overall Cognitive Status Impaired   Arousal/Participation Cooperative   Attention Attends with cues to redirect   Orientation Level Oriented to person;Disoriented to person;Disoriented to place;Disoriented to time;Disoriented to situation  (responds to name but unable to state birthday)   Memory Decreased recall of recent events;Decreased short term memory;Decreased recall of biographical information   Following Commands Follows one step commands with increased time or repetition   Comments hx dementia per chart review   Bed Mobility   Supine to Sit 2   Maximal assistance   Additional items Assist x 2;HOB elevated;Bedrails;Increased time required;Verbal cues;LE management;Other  (trunk support)   Additional Comments cues for direction, technique   Transfers   Sit to Stand 4  Minimal assistance   Additional items Assist x 1;Assist x 2;Increased time required;Verbal cues;Other  (RW)   Stand to Sit 3  Moderate assistance   Additional items Assist x 2;Increased time required;Impulsive;Verbal cues;Other  (RW)   Additional Comments cues for direction, encouragement   Ambulation/Elevation   Gait pattern Improper Weight shift;Forward Flexion;Decreased foot clearance;Short stride;Excessively slow   Gait Assistance 4  Minimal assist   Additional items Assist x 2;Verbal cues  (cues for direction, encouragement)   Assistive Device Rolling walker   Distance 2' bed>chair   Balance   Static Standing Poor +   Dynamic Standing Poor   Ambulatory Poor  (impulsive)   Endurance Deficit   Endurance Deficit No  (reports dizziness sitting EOB. seemed to improve w time. vitals during session 127/73, 95, 95% EOB. 107/74, 99, 96% seated post activity)   Activity Tolerance   Activity Tolerance Other (Comment)  (cognition- fearfulness)   Medical Staff Made Aware Sydney OT; urology   Nurse Made Aware SB RN   Assessment   Prognosis Fair   Problem List Decreased strength;Impaired balance;Decreased mobility;Impaired judgement;Decreased cognition;Decreased safety awareness;Obesity;Pain   Assessment Tiana De La Cruz is a 78 y.o. female admitted to [facility] on 11/12/2024 for Hydronephrosis with ureteral calculus. PT was consulted and pt was seen on 11/13/2024 for mobility assessment and d/c planning. Pt presents w high fall risk. Resides at SNF. Per patient Ax2 to transf OOB however would clarify given poor historian secondary to dementia. Pt is currently functioning at a max Ax2 for bed mobility primarily dt decreased effort as well as weakness; min Ax1-2 to stand dt impulsivity, weakness; min Ax2 to take  steps bed>chair w RW for safety dt cognition and impulsivity; mod Ax2 to sit dt poorly controlled descent and impulsivity. Upon standing pt w sudden anxiety and fearfulness of falling- redirectable w cuing. Unable to ambulate any significant distance dt sx and safety concerns from impulsivity. Will maintain on caseload to optimize mobility during hospital stay. Can re-evaluate POC based on pt baseline LOF. Recommend return to SNF w PT services as appropriate.   Barriers to Discharge None   Goals   Patient Goals have water   STG Expiration Date 11/23/24   Short Term Goal #1 1) Pt will perform bed mobility with max Ax1 demonstrating appropriate technique 100% of the time in order to improve function. 2) Perform all transfers with min Ax1 demonstrating safe and appropriate technique 100% of the time in order to improve ability to negotiate safely in home environment. 3)  wc mobility >10' at min A level. 4)  Improve overall strength and balance 1/2 grade in order to optimize ability to perform functional tasks and reduce fall risk.5) Increase activity tolerance to 40 minutes in order to improve endurance to functional tasks. 6) Improve am-pac by 2. 7) Ambulate 5' at min Ax1 level.   Plan   Treatment/Interventions Functional transfer training;LE strengthening/ROM;Therapeutic exercise;Cognitive reorientation;Patient/family training;Equipment eval/education;Bed mobility;Gait training;Continued evaluation;Spoke to nursing;OT   PT Frequency 2-3x/wk   Discharge Recommendation   Rehab Resource Intensity Level, PT II (Moderate Resource Intensity)  (return to SNF. PT as appropriate)   AM-PAC Basic Mobility Inpatient   Turning in Flat Bed Without Bedrails 1   Lying on Back to Sitting on Edge of Flat Bed Without Bedrails 1   Moving Bed to Chair 1   Standing Up From Chair Using Arms 3   Walk in Room 2   Climb 3-5 Stairs With Railing 1   Basic Mobility Inpatient Raw Score 9   Turning Head Towards Sound 2   Follow Simple Instructions  2   Low Function Basic Mobility Raw Score  13   Low Function Basic Mobility Standardized Score  20.14   [facility] Highest Level Of Mobility   -[facility] Goal 3: Sit at edge of bed   -[facility] Achieved 4: Move to chair/commode   End of Consult   Patient Position at End of Consult Bedside chair;Bed/Chair alarm activated;All needs within reach   History: co - morbidities including age, ?use of assistive device/ assist for mobility, assist for adl's/ IADL's, cognition, current experience including fall risk  Exam: impairments in systems including multiple body structures involved; musculoskeletal (strength 3- knee ext), neuromuscular (balance, gait, transfers), cardiopulmonary (vitals), cognition; activity limitations  (difficulties executing an action); participation restrictions (problems associated w involvement in life situations), AM-PAC  Clinical: unstable/unpredictable  Complexity:high    Florinda Trejo, PT

## 2024-11-13 NOTE — ASSESSMENT & PLAN NOTE
Basal cell carcinoma of vulva diagnosed in 2023  Per discussion with patient and son, patient declined further investigation/intervention of this

## 2024-11-13 NOTE — ANESTHESIA POSTPROCEDURE EVALUATION
Post-Op Assessment Note    CV Status:  Stable  Pain Score: 0    Pain management: adequate       Mental Status:  Sleepy   Hydration Status:  Euvolemic   PONV Controlled:  Controlled   Airway Patency:  Patent     Post Op Vitals Reviewed: Yes              Last Filed PACU Vitals:  Vitals Value Taken Time   Temp 97 °F (36.1 °C) 11/13/24 1541   Pulse 68 11/13/24 1544   /65 11/13/24 1542   Resp 11 11/13/24 1544   SpO2 99 % 11/13/24 1544   Vitals shown include unfiled device data.    Modified Kathy:  Activity: 2 (11/13/2024  3:41 PM)  Respiration: 2 (11/13/2024  3:41 PM)  Circulation: 2 (11/13/2024  3:41 PM)  Consciousness: 1 (11/13/2024  3:41 PM)  Oxygen Saturation: 1 (11/13/2024  3:41 PM)  Modified Kathy Score: 8 (11/13/2024  3:41 PM)

## 2024-11-13 NOTE — INTERVAL H&P NOTE
H&P reviewed. After examining the patient I find no changes in the patients condition since the H&P had been written.    Vitals:    11/13/24 0954   BP: 107/74   Pulse: 96   Resp:    Temp:    SpO2: 96%

## 2024-11-13 NOTE — PLAN OF CARE
Problem: METABOLIC, FLUID AND ELECTROLYTES - ADULT  Goal: Electrolytes maintained within normal limits  Description: INTERVENTIONS:  - Monitor labs and assess patient for signs and symptoms of electrolyte imbalances  - Administer electrolyte replacement as ordered  - Monitor response to electrolyte replacements, including repeat lab results as appropriate  - Instruct patient on fluid and nutrition as appropriate  Outcome: Progressing      Problem: SKIN/TISSUE INTEGRITY - ADULT  Goal: Incision(s), wounds(s) or drain site(s) healing without S/S of infection  Description: INTERVENTIONS  - Assess and document dressing, incision, wound bed, drain sites and surrounding tissue  - Provide patient and family education  - Perform skin care/dressing changes every shift/ per order   Outcome: Progressing  Goal: Pressure injury heals and does not worsen  Description: Interventions:  - Implement low air loss mattress or specialty surface (Criteria met)  - Apply silicone foam dressing  - Instruct/assist with weight shifting every 15 minutes when in chair   - Limit chair time to 1 hour intervals  - Use special pressure reducing interventions such as waffle cushion  when in chair   - Apply fecal or urinary incontinence containment device   - Perform passive or active ROM every 2 hour   - Turn and reposition patient & offload bony prominences every 2  hours   - Utilize friction reducing device or surface for transfers   - Consider consults to  interdisciplinary teams such as Wound care   - Use incontinent care products after each incontinent episode such as Zinc Oxide Paste/ per order wound care  - Consider nutrition services referral as needed  Outcome: Progressing        Problem: GENITOURINARY - ADULT  Goal: Absence of urinary retention  Description: INTERVENTIONS:  - Assess patient’s ability to void and empty bladder  - Monitor I/O  - Bladder scan as needed  - Discuss with physician/AP medications to alleviate retention as  needed  - Discuss catheterization for long term situations as appropriate  Outcome: Progressing     Problem: GENITOURINARY - ADULT  Goal: Maintains or returns to baseline urinary function  Description: INTERVENTIONS:  - Assess urinary function  - Encourage oral fluids to ensure adequate hydration if ordered  - Administer IV fluids as ordered to ensure adequate hydration  - Administer ordered medications as needed  - Offer frequent toileting  - Follow urinary retention protocol if ordered  Outcome: Progressing        Problem: SAFETY ADULT  Goal: Patient will remain free of falls  Description: INTERVENTIONS:  - Educate patient/family on patient safety including physical limitations  - Instruct patient to call for assistance with activity   - Consult OT/PT to assist with strengthening/mobility   - Keep Call bell within reach  - Keep bed low and locked with side rails adjusted as appropriate  - Keep care items and personal belongings within reach  - Initiate and maintain comfort rounds  - Make Fall Risk Sign visible to staff  - Offer Toileting every 2  Hours, in advance of need  - Initiate/Maintain bed alarm  - Obtain necessary fall risk management equipment:   - Apply yellow socks and bracelet for high fall risk patients  - Consider moving patient to room near nurses station  Outcome: Progressing     Problem: SAFETY ADULT  Goal: Maintain or return to baseline ADL function  Description: INTERVENTIONS:  -  Assess patient's ability to carry out ADLs; assess patient's baseline for ADL function and identify physical deficits which impact ability to perform ADLs (bathing, care of mouth/teeth, toileting, grooming, dressing, etc.)  - Assess/evaluate cause of self-care deficits   - Assess range of motion  - Assess patient's mobility; develop plan if impaired  - Assess patient's need for assistive devices and provide as appropriate  - Encourage maximum independence but intervene and supervise when necessary  - Involve family in  performance of ADLs  - Assess for home care needs following discharge   - Consider OT consult to assist with ADL evaluation and planning for discharge  - Provide patient education as appropriate  Outcome: Progressing            Problem: HEMATOLOGIC - ADULT  Goal: Maintains hematologic stability  Description: INTERVENTIONS  - Assess for signs and symptoms of bleeding or hemorrhage  - Monitor labs  - Administer supportive blood products/factors as ordered and appropriate  Outcome: Progressing

## 2024-11-13 NOTE — DISCHARGE INSTR - OTHER ORDERS
Wound Care Plan:   1-Apply Remedy silicone cream to bilateral heels twice daily for skin protection/hydration.  2-Elevate heels off of bed/chair surface--offloading heel boots.  3-Offloading air cushion in chair when out of bed.  4-Apply lotion to body daily and as needed.  5-Turn/reposition every 2 hours while in bed and weight shift frequently while in chair for pressure re-distribution on skin.   6-Tortoise positioning system while in bed.  7-Left pretibial--cleanse with normal saline, pat dry.  Apply silicone bordered foam dressing.  Change dressing every 3 days and as needed.  8-Left perineal wound--irrigate with warm water using joe squirt bottle.  Apply Vashe soak for 2-5 minutes, remove, pat dry.  Apply Triad paste two times daily and as needed with incontinence care.

## 2024-11-13 NOTE — H&P (VIEW-ONLY)
Consult - Urology   Tiana De La Cruz 1945, 78 y.o. female MRN: [id number]    Unit/Bed#: [place] [address] -01 Encounter: [id number]      Disruption of perineal wound in female  Assessment & Plan  Present on admission  Wound care consulted  Possible contributing to ongoing sepsis     DVT (deep venous thrombosis) (HCC)  Assessment & Plan  Vas venous duplex 11/12  (LE) -- LEFT (Evidence suggestive of Acute deep vein thrombosis noted in the external iliac,common femoral, femoral, and popliteal veins.) RIGHT (Evidence suggestive of Acute deep vein thrombosis noted in the common femoral, deep femoral and posterior tibial veins.   Initiated on Heparin drip   PTT elevated 183     Plan:  Proceed with surgery despite elevated PTT - anticipate bleeding post-op     * Hydronephrosis with ureteral calculus  Assessment & Plan  Presented to [facility] ED 11/12 with fever from her facility   SIRS met at presentation --  WBC 15, temperature 101.9 °F, lactic acid 4.8  Initiated on ceftriaxone   CT a/p shows incidental finding of 7mm obstructing right ureteral stone causing moderate hydronephrosis   Creatinine remained stable  Denied pain   WBC continues to increase 15 (at presentation) --> 28 today   UA positive for infection   Case request placed for right ureteral stent placement this afternoon    Plan:  Attempting to obtain surgical consent from the patients son (Dennis De La Cruz)   Continue IV antibiotics per SLIM   Maintain NPO status   Proceed with surgical intervention this afternoon   Continue to monitor vitals closely for any sign of deterioration   She will require second stage stone procedure for definitive management once she is infection free       Subjective:   Tiana is a 78-year-old female with dementia and known left perineal/buttock wound--who presented to [facility] ED 11/12 with complaints of fever at her facility.  At presentation she met SIRS criteria -- WBC 15, temperature 101.9 °F, lactic acid 4.8.  UA positive for infection.  CT A/P  "reveals 7 mm right proximal ureteral calculi with moderate hydronephrosis as well as multiple DVTs  (right common femoral, right deep femoral and right posterior tibial veins; left external iliac, left common femoral, left femoral and left popliteal veins).  Initiated on heparin per primary team.     In consultation the patient is sitting on the edge of the bed, working with physical therapy. She reports dizziness and vision changes, she is shaky. She denies pain in the bladder or back area. She is overall confused, oriented to self. Heparin ongoing for known DVTs - PTT elevated at 183. WBC continues to rise at 28 today. Bacteremia present - blood cultures returning positive for gram negative rods. Creatinine remains stable at 0.87. Urology plans to proceed with right ureteral stent placement today. Case request placed. I personally reached out to the patients son x3 to obtain surgical consent as the patient is not deemed competent - he has yet to answer.     ADDENDUM -- registration reached out saying there was an error and the primary contact entered into the chart is INCORRECT. Dennis De La Cruz (patients son) will be contacted now for consult purposes     Review of Systems   Unable to perform ROS: Dementia   Neurological:  Positive for dizziness.       Objective:  Vitals: Blood pressure 107/74, pulse 96, temperature 99.2 °F (37.3 °C), temperature source Oral, resp. rate 18, height 5' 7\" (1.702 m), weight 72.4 kg (159 lb 9.8 oz), SpO2 96%.,Body mass index is 25 kg/m².    Physical Exam  Vitals and nursing note reviewed.   Constitutional:       General: She is not in acute distress.     Appearance: Normal appearance. She is ill-appearing.      Comments: Shaking at bedside working with PT   HENT:      Head: Normocephalic and atraumatic.      Right Ear: External ear normal.      Left Ear: External ear normal.      Nose: Nose normal.      Mouth/Throat:      Pharynx: Oropharynx is clear.   Eyes:      Extraocular Movements: " Extraocular movements intact.      Conjunctiva/sclera: Conjunctivae normal.   Cardiovascular:      Rate and Rhythm: Normal rate.   Pulmonary:      Effort: Pulmonary effort is normal.   Abdominal:      General: Abdomen is flat. There is no distension.      Palpations: Abdomen is soft.      Tenderness: There is no abdominal tenderness. There is no right CVA tenderness or left CVA tenderness.   Musculoskeletal:         General: Normal range of motion.      Cervical back: Normal range of motion.   Skin:     Comments: Dizziness and vision changes    Neurological:      General: No focal deficit present.      Mental Status: She is alert and oriented to person, place, and time.   Psychiatric:         Mood and Affect: Mood normal.         Behavior: Behavior normal.       Imaging:    CT CHEST, ABDOMEN AND PELVIS WITH IV CONTRAST    INDICATION: fever, large deep perineal wound.    COMPARISON: None.    TECHNIQUE: CT examination of the chest, abdomen and pelvis was performed. Multiplanar 2D reformatted images were created from the source data.    This examination, like all CT scans performed in the [facility], was performed utilizing techniques to minimize radiation dose exposure, including the use of iterative reconstruction and automated exposure control. Radiation dose length  product (DLP) for this visit: 1001 mGy-cm    IV Contrast: 100 mL of iohexol (OMNIPAQUE)  Enteric Contrast: Not administered.    FINDINGS:    CHEST    LUNGS: Lungs are clear. No tracheal or endobronchial lesion.    PLEURA: Unremarkable.    HEART/GREAT VESSELS: Heart is unremarkable for patient's age. No thoracic aortic aneurysm.    MEDIASTINUM AND CHRISTINE: There are mildly prominent mediastinal and right hilar nodes which measure up to 1 cm in diameter and are likely reactive.    CHEST WALL AND LOWER NECK: Unremarkable.    ABDOMEN    LIVER/BILIARY TREE: Unremarkable.    GALLBLADDER: No calcified gallstones. No pericholecystic inflammatory  change.    SPLEEN: Unremarkable.    PANCREAS: Unremarkable.    ADRENAL GLANDS: Unremarkable.    KIDNEYS/URETERS: There is a 6 x 7 mm right proximal ureteral calculus with moderate hydronephrosis and hydroureter. There is urothelial enhancement suggesting associated ureteritis. There is slight decreased enhancement of the right kidney relative to  the left compatible with an obstructive uropathy. Nonobstructive bilateral calculi are seen. Hypodensities in both kidneys likely reflect cysts.    STOMACH AND BOWEL: Unremarkable.    APPENDIX: No findings to suggest appendicitis.    ABDOMINOPELVIC CAVITY: No ascites. No pneumoperitoneum. No lymphadenopathy.    VESSELS: There is a filling defect extending from the left external iliac vein through the left common femoral vein compatible with DVT. DVT on the right in the common femoral vein may also be present, however the relative diminished opacification on the   right degrades the evaluation. There is mild aortic atherosclerosis without aneurysm.    PELVIS    REPRODUCTIVE ORGANS: Post hysterectomy.    URINARY BLADDER: Unremarkable.    ABDOMINAL WALL/INGUINAL REGIONS: There is a defect in the left perineal soft tissues on image 262 of series 2. No adjacent fluid collection or subcutaneous emphysema identified.    BONES: No acute fracture or suspicious osseous lesion.   Impression:         1. 7 mm right proximal ureteral calculus with moderate hydronephrosis and hydroureter. There is associated urothelial thickening and enhancement suggesting associated ureteritis  2. Left iliac and right common femoral vein DVT suspected. Recommend further characterization with DVT ultrasound.  3. Left peroneal defect may correspond to the wound. No fluid collection seen deep to this wound.     Imaging reviewed - both report and images personally reviewed.     Labs:  Recent Labs     11/12/24  1140 11/13/24  0655   WBC 15.05* 28.33*     Recent Labs     11/12/24  1140 11/13/24  0655   HGB  12.5 11.0*       Recent Labs     11/12/24  1140 11/13/24  0655   CREATININE 1.48* 0.87       Microbiology:  Blood cultures x2 gram negative rods   Urine culture - pending     History:  Social History     Socioeconomic History    Marital status:      Spouse name: Not on file    Number of children: Not on file    Years of education: Not on file    Highest education level: Not on file   Occupational History    Occupation: Retired - Cert Surgical Tech   Tobacco Use    Smoking status: Former    Smokeless tobacco: Never    Tobacco comments:     Quit 28 years ago   Substance and Sexual Activity    Alcohol use: Never    Drug use: Not on file    Sexual activity: Not on file   Other Topics Concern    Not on file   Social History Narrative    Not on file     Social Drivers of Health     Financial Resource Strain: Low Risk  (7/10/2023)    Received from [facility]    Overall Financial Resource Strain (CARDIA)     Difficulty of Paying Living Expenses: Not hard at all   Food Insecurity: No Food Insecurity (7/10/2023)    Received from [facility]    Hunger Vital Sign     Worried About Running Out of Food in the Last Year: Never true     Ran Out of Food in the Last Year: Never true   Transportation Needs: No Transportation Needs (7/10/2023)    Received from [facility]    PRAPARE - Transportation     Lack of Transportation (Medical): No     Lack of Transportation (Non-Medical): No   Physical Activity: Not on file   Stress: Not on file   Social Connections: Unknown (6/18/2024)    Received from [facility]    Social Connections     How often do you feel lonely or isolated from those around you? (Adult - for ages 18 years and over): Not on file   Intimate Partner Violence: Not At Risk (7/10/2023)    Received from [facility]    Humiliation, Afraid, Rape, and Kick questionnaire     Fear of Current or Ex-Partner: No     Emotionally Abused: No     Physically Abused:  No     Sexually Abused: No   Housing Stability: Not on file       Past Medical History:   Diagnosis Date    Anxiety     Colon polyps     Depression     Fibromyalgia     Hypothyroid     Lumbar spondylosis     Osteoarthritis      Past Surgical History:   Procedure Laterality Date    COLONOSCOPY  07/2016    diverticulosis    COLONOSCOPY  04/2019    diverticulosis and hemorrhoids    COLONOSCOPY  2015    DR AUGUSTINE/TUBULAR ADENOMA    EGD  04/2019    erosive gastropathy, and 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    EGD  04/23/2021    EGD SL/   GASTRITIS    HYSTERECTOMY       Family History   Problem Relation Age of Onset    Colon cancer Neg Hx        Adali Guidry PA-C  Date: 11/13/2024 Time: 11:42 AM

## 2024-11-13 NOTE — ASSESSMENT & PLAN NOTE
History of hypothyroidism dementia and seizure disorder who presents to the hospital with fever  Sepsis present admission secondary to gram-negative paul bacteremia urinary source  Continue ceftriaxone.  Urology: Going for cystoscopy today  Follow-up on blood and urine cultures

## 2024-11-13 NOTE — CASE MANAGEMENT
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     Anxiety      Hypothyroid      Fibromyalgia      Right hip pain 2021     Back pain 2021     IBS (irritable bowel syndrome) 2021     Abnormal CT of liver 2021       LOS (days): 1  Geometric Mean LOS (GMLOS) (days): 3.5  Days to GMLOS:2.6     OBJECTIVE:    Risk of Unplanned Readmission Score: 11.2         Current admission status: Inpatient       Preferred Pharmacy:   [facility] - [place], [place] - [address] [place] [address]  [address] [place] [address]  [address]  [facility] [address]  Phone: [phone] Fax: [phone]    [facility] [address] [place] [address] - [address] [address]  Phone: [phone] Fax: [phone]    Primary Care Provider: Erica Lomeli DO    Primary Insurance: AARP MC REP  Secondary Insurance:     ASSESSMENT:  Active Health Care Proxies       Dennis De La Cruz [facility]  - Son   Primary Phone: [phone] (Mobile)                 Advance Directives  Primary Contact: Dennis De La Cruz- Joshua [phone]         Readmission Root Cause  30 Day Readmission: No    Patient Information  Admitted from:: Facility ([facility])  Mental Status: Confused, Alert (h/o dementia)  During Assessment patient was accompanied by: Not accompanied during assessment  Assessment information provided by:: Other - please comment (chart from [facility])  Primary Caregiver:  Other (Comment) ([facility])  Support Systems: Son, Family members, Other (Comment) (SNF staff)  County of Residence: [place]  What [facility] do you live in?: [place]  Home entry access options. Select all that apply.: No steps to enter home  Type of Current Residence: Facility  Upon entering residence, is there a bedroom on the main floor (no further steps)?: Yes  Upon entering residence, is there a bathroom on the main floor (no further steps)?: Yes  Living Arrangements: Lives in Facility    Activities of Daily Living Prior to Admission  Functional Status: Assistance  Completes ADLs independently?: No  Level of ADL dependence: Total Dependent  Ambulates independently?: No (transfers asst x2)  Does patient use assisted devices?: Yes  Does the patient have a history of Short-Term Rehab?: Yes  Does patient have a history of HHC?: Yes  Does patient currently have HHC?: No         Patient Information Continued  Income Source: SSI/SSD  Does patient have prescription coverage?: Yes  Does patient have a history of substance abuse?: No  Does patient have a history of Mental Health Diagnosis?: No         Means of Transportation  Means of Transport to Appts:: Other (Comment) (Medical transport)          DISCHARGE DETAILS:          Comments - Freedom of Choice: Pt is LTC at [facility] being a 15D MA bed hold  CM contacted family/caregiver?: No- see comments (pt's son/HCR updated by attending)  Were Treatment Team discharge recommendations reviewed with patient/caregiver?: Yes          Contacts  Patient Contacts: Dennis De La Cruz- son  Relationship to Patient:: Family  Contact Method: Phone  Phone Number: [phone]  Reason/Outcome: Emergency Contact    Requested Home Health Care         Is the patient interested in HHC at discharge?: No    DME Referral Provided  Referral made for DME?: No              Treatment Team Recommendation: Facility Return, SNF  Discharge Destination Plan:: Facility Return, SNF ([facility])  Transport at  Discharge : Diomedes owusu

## 2024-11-13 NOTE — CONSULTS
Consult - Urology   Tiana De La Cruz 1945, 78 y.o. 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   Initiated on Heparin drip   PTT elevated 183     Plan:  Proceed with surgery despite elevated PTT - anticipate bleeding post-op     * Hydronephrosis with ureteral calculus  Assessment & Plan  Presented to [facility] ED 11/12 with fever from her facility   SIRS met at presentation --  WBC 15, temperature 101.9 °F, lactic acid 4.8  Initiated on ceftriaxone   CT a/p shows incidental finding of 7mm obstructing right ureteral stone causing moderate hydronephrosis   Creatinine remained stable  Denied pain   WBC continues to increase 15 (at presentation) --> 28 today   UA positive for infection   Case request placed for right ureteral stent placement this afternoon    Plan:  Attempting to obtain surgical consent from the patients son (Dennis De La Cruz)   Continue IV antibiotics per SLIM   Maintain NPO status   Proceed with surgical intervention this afternoon   Continue to monitor vitals closely for any sign of deterioration   She will require second stage stone procedure for definitive management once she is infection free       Subjective:   Tiana is a 78-year-old female with dementia and known left perineal/buttock wound--who presented to [facility] ED 11/12 with complaints of fever at her facility.  At presentation she met SIRS criteria -- WBC 15, temperature 101.9 °F, lactic acid 4.8.  UA positive for infection.  CT A/P  "reveals 7 mm right proximal ureteral calculi with moderate hydronephrosis as well as multiple DVTs  (right common femoral, right deep femoral and right posterior tibial veins; left external iliac, left common femoral, left femoral and left popliteal veins).  Initiated on heparin per primary team.     In consultation the patient is sitting on the edge of the bed, working with physical therapy. She reports dizziness and vision changes, she is shaky. She denies pain in the bladder or back area. She is overall confused, oriented to self. Heparin ongoing for known DVTs - PTT elevated at 183. WBC continues to rise at 28 today. Bacteremia present - blood cultures returning positive for gram negative rods. Creatinine remains stable at 0.87. Urology plans to proceed with right ureteral stent placement today. Case request placed. I personally reached out to the patients son x3 to obtain surgical consent as the patient is not deemed competent - he has yet to answer.     ADDENDUM -- registration reached out saying there was an error and the primary contact entered into the chart is INCORRECT. Dennis De La Cruz (patients son) will be contacted now for consult purposes     Review of Systems   Unable to perform ROS: Dementia   Neurological:  Positive for dizziness.       Objective:  Vitals: Blood pressure 107/74, pulse 96, temperature 99.2 °F (37.3 °C), temperature source Oral, resp. rate 18, height 5' 7\" (1.702 m), weight 72.4 kg (159 lb 9.8 oz), SpO2 96%.,Body mass index is 25 kg/m².    Physical Exam  Vitals and nursing note reviewed.   Constitutional:       General: She is not in acute distress.     Appearance: Normal appearance. She is ill-appearing.      Comments: Shaking at bedside working with PT   HENT:      Head: Normocephalic and atraumatic.      Right Ear: External ear normal.      Left Ear: External ear normal.      Nose: Nose normal.      Mouth/Throat:      Pharynx: Oropharynx is clear.   Eyes:      Extraocular Movements: " Extraocular movements intact.      Conjunctiva/sclera: Conjunctivae normal.   Cardiovascular:      Rate and Rhythm: Normal rate.   Pulmonary:      Effort: Pulmonary effort is normal.   Abdominal:      General: Abdomen is flat. There is no distension.      Palpations: Abdomen is soft.      Tenderness: There is no abdominal tenderness. There is no right CVA tenderness or left CVA tenderness.   Musculoskeletal:         General: Normal range of motion.      Cervical back: Normal range of motion.   Skin:     Comments: Dizziness and vision changes    Neurological:      General: No focal deficit present.      Mental Status: She is alert and oriented to person, place, and time.   Psychiatric:         Mood and Affect: Mood normal.         Behavior: Behavior normal.       Imaging:    CT CHEST, ABDOMEN AND PELVIS WITH IV CONTRAST    INDICATION: fever, large deep perineal wound.    COMPARISON: None.    TECHNIQUE: CT examination of the chest, abdomen and pelvis was performed. Multiplanar 2D reformatted images were created from the source data.    This examination, like all CT scans performed in the [facility], was performed utilizing techniques to minimize radiation dose exposure, including the use of iterative reconstruction and automated exposure control. Radiation dose length  product (DLP) for this visit: 1001 mGy-cm    IV Contrast: 100 mL of iohexol (OMNIPAQUE)  Enteric Contrast: Not administered.    FINDINGS:    CHEST    LUNGS: Lungs are clear. No tracheal or endobronchial lesion.    PLEURA: Unremarkable.    HEART/GREAT VESSELS: Heart is unremarkable for patient's age. No thoracic aortic aneurysm.    MEDIASTINUM AND CHRISTINE: There are mildly prominent mediastinal and right hilar nodes which measure up to 1 cm in diameter and are likely reactive.    CHEST WALL AND LOWER NECK: Unremarkable.    ABDOMEN    LIVER/BILIARY TREE: Unremarkable.    GALLBLADDER: No calcified gallstones. No pericholecystic inflammatory  change.    SPLEEN: Unremarkable.    PANCREAS: Unremarkable.    ADRENAL GLANDS: Unremarkable.    KIDNEYS/URETERS: There is a 6 x 7 mm right proximal ureteral calculus with moderate hydronephrosis and hydroureter. There is urothelial enhancement suggesting associated ureteritis. There is slight decreased enhancement of the right kidney relative to  the left compatible with an obstructive uropathy. Nonobstructive bilateral calculi are seen. Hypodensities in both kidneys likely reflect cysts.    STOMACH AND BOWEL: Unremarkable.    APPENDIX: No findings to suggest appendicitis.    ABDOMINOPELVIC CAVITY: No ascites. No pneumoperitoneum. No lymphadenopathy.    VESSELS: There is a filling defect extending from the left external iliac vein through the left common femoral vein compatible with DVT. DVT on the right in the common femoral vein may also be present, however the relative diminished opacification on the   right degrades the evaluation. There is mild aortic atherosclerosis without aneurysm.    PELVIS    REPRODUCTIVE ORGANS: Post hysterectomy.    URINARY BLADDER: Unremarkable.    ABDOMINAL WALL/INGUINAL REGIONS: There is a defect in the left perineal soft tissues on image 262 of series 2. No adjacent fluid collection or subcutaneous emphysema identified.    BONES: No acute fracture or suspicious osseous lesion.   Impression:         1. 7 mm right proximal ureteral calculus with moderate hydronephrosis and hydroureter. There is associated urothelial thickening and enhancement suggesting associated ureteritis  2. Left iliac and right common femoral vein DVT suspected. Recommend further characterization with DVT ultrasound.  3. Left peroneal defect may correspond to the wound. No fluid collection seen deep to this wound.     Imaging reviewed - both report and images personally reviewed.     Labs:  Recent Labs     11/12/24  1140 11/13/24  0655   WBC 15.05* 28.33*     Recent Labs     11/12/24  1140 11/13/24  0655   HGB  12.5 11.0*       Recent Labs     11/12/24  1140 11/13/24  0655   CREATININE 1.48* 0.87       Microbiology:  Blood cultures x2 gram negative rods   Urine culture - pending     History:  Social History     Socioeconomic History    Marital status:      Spouse name: Not on file    Number of children: Not on file    Years of education: Not on file    Highest education level: Not on file   Occupational History    Occupation: Retired - Cert Surgical Tech   Tobacco Use    Smoking status: Former    Smokeless tobacco: Never    Tobacco comments:     Quit 28 years ago   Substance and Sexual Activity    Alcohol use: Never    Drug use: Not on file    Sexual activity: Not on file   Other Topics Concern    Not on file   Social History Narrative    Not on file     Social Drivers of Health     Financial Resource Strain: Low Risk  (7/10/2023)    Received from [facility]    Overall Financial Resource Strain (CARDIA)     Difficulty of Paying Living Expenses: Not hard at all   Food Insecurity: No Food Insecurity (7/10/2023)    Received from [facility]    Hunger Vital Sign     Worried About Running Out of Food in the Last Year: Never true     Ran Out of Food in the Last Year: Never true   Transportation Needs: No Transportation Needs (7/10/2023)    Received from [facility]    PRAPARE - Transportation     Lack of Transportation (Medical): No     Lack of Transportation (Non-Medical): No   Physical Activity: Not on file   Stress: Not on file   Social Connections: Unknown (6/18/2024)    Received from [employer]    Social Connections     How often do you feel lonely or isolated from those around you? (Adult - for ages 18 years and over): Not on file   Intimate Partner Violence: Not At Risk (7/10/2023)    Received from [facility]    Humiliation, Afraid, Rape, and Kick questionnaire     Fear of Current or Ex-Partner: No     Emotionally Abused: No     Physically Abused:  No     Sexually Abused: No   Housing Stability: Not on file       Past Medical History:   Diagnosis Date    Anxiety     Colon polyps     Depression     Fibromyalgia     Hypothyroid     Lumbar spondylosis     Osteoarthritis      Past Surgical History:   Procedure Laterality Date    COLONOSCOPY  07/2016    diverticulosis    COLONOSCOPY  04/2019    diverticulosis and hemorrhoids    COLONOSCOPY  2015    DR AUGUSTINE/TUBULAR ADENOMA    EGD  04/2019    erosive gastropathy, and 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    EGD  04/23/2021    EGD SL/   GASTRITIS    HYSTERECTOMY       Family History   Problem Relation Age of Onset    Colon cancer Neg Hx        Adali Guidry PA-C  Date: 11/13/2024 Time: 11:42 AM

## 2024-11-13 NOTE — ANESTHESIA PREPROCEDURE EVALUATION
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      Anesthesia Plan  ASA Score- 3     Anesthesia Type- general with ASA Monitors.         Additional Monitors:     Airway Plan: ETT and LMA.    Comment: Phone consent obtained from Dennis De La Cruz for anesthesia.       Plan Factors-    Chart reviewed.    Patient summary reviewed.                  Induction- intravenous.    Postoperative Plan-     Perioperative Resuscitation Plan - Level 1 - Full Code.       Informed Consent- Anesthetic plan and risks discussed with patient, son and healthcare power of .

## 2024-11-13 NOTE — ANESTHESIA POSTPROCEDURE EVALUATION
Post-Op Assessment Note    CV Status:  Stable    Pain management: adequate       Mental Status:  Alert and awake   Hydration Status:  Euvolemic   PONV Controlled:  Controlled   Airway Patency:  Patent     Post Op Vitals Reviewed: Yes    No anethesia notable event occurred.    Staff: Anesthesiologist           Last Filed PACU Vitals:  Vitals Value Taken Time   Temp 97 °F (36.1 °C) 11/13/24 1541   Pulse 80 11/13/24 1609   /65 11/13/24 1557   Resp 13 11/13/24 1609   SpO2 96 % 11/13/24 1609   Vitals shown include unfiled device data.    Modified Kathy:  Activity: 2 (11/13/2024  3:57 PM)  Respiration: 2 (11/13/2024  3:57 PM)  Circulation: 2 (11/13/2024  3:57 PM)  Consciousness: 1 (11/13/2024  3:57 PM)  Oxygen Saturation: 1 (11/13/2024  3:57 PM)  Modified Kathy Score: 8 (11/13/2024  3:57 PM)

## 2024-11-13 NOTE — PLAN OF CARE
Problem: OCCUPATIONAL THERAPY ADULT  Goal: Performs self-care activities at highest level of function for planned discharge setting.  See evaluation for individualized goals.  Description: Treatment Interventions: ADL retraining, Functional transfer training, UE strengthening/ROM, Endurance training, Cognitive reorientation, Patient/family training, Equipment evaluation/education, Neuromuscular reeducation, Compensatory technique education, Continued evaluation, Energy conservation, Activityengagement          See flowsheet documentation for full assessment, interventions and recommendations.   Note: Limitation: Decreased ADL status, Decreased UE strength, Decreased cognition, Decreased Safe judgement during ADL, Decreased endurance, Decreased self-care trans, Decreased high-level ADLs (dec balance, functional reach, coordination)  Prognosis: Fair  Assessment: Patient is a 78 y.o. year old female seen for OT eval s/p admit to [facility] on 11/12/2024 with hydronephrosis with ureteral calculus, DVT, disruption of perineal wound in female.  OT consulted to assess ADLs/IADLs/functional mobility and assist w/ D/C planning. Patient demonstrates the following deficits impacting occupational performance: decreased strength , decreased balance, decreased activity tolerance, limited functional reach, impaired GMC, impaired FMC, impaired memory, impaired sequencing, impaired problem solving, impulsivity, decreased safety awareness, dizziness, increased body habitus , impaired coordination, and decreased cardiovascular endurance. These impairments, as well at pt’s difficulty performing ADLs, difficulty performing IADLs, difficulty performing transfers/mobility, limited insight into deficits, compliance, fall risk , functional decline , increased reliance on DME , advanced age, and multiple admissions , limit pt’s ability to safely engage in all baseline areas of occupation. Pt's CLOF as follows: eating/grooming: supervision  and  Heriberto, UB ADLs: Heriberto and modA, LB ADLs: maxA and dependent, toileting: dependent, bed mobility: modAx2, functional transfers: Heriberto and modAx2, functional mobility: modAx2, sitting/standing tolerance: ~4 min. Pt would benefit from continued skilled OT while in acute setting to address deficits as defined above and to maximize (I) w/ ADLs/functional mobility. Occupational performance areas to address include: grooming, bathing/shower, toilet hygiene, dressing, socialization, and functional mobility. Based on the aforementioned evaluation, functional performance deficits, and assessments, pt has been identified as a high complexity evaluation. At this time, recommendation for pt to receive post-acute rehabilitation services at a Level II (moderate resource intensity) due to above deficits and CLOF. OT will continue to follow pt 2-5x/wk to address the goals listed below to  w/in 10-14 days.     Rehab Resource Intensity Level, OT: II (Moderate Resource Intensity)

## 2024-11-13 NOTE — PLAN OF CARE
Problem: Potential for Falls  Goal: Patient will remain free of falls  Description: INTERVENTIONS:  - Educate patient/family on patient safety including physical limitations  - Instruct patient to call for assistance with activity   - Consult OT/PT to assist with strengthening/mobility   - Keep Call bell within reach  - Keep bed low and locked with side rails adjusted as appropriate  - Keep care items and personal belongings within reach  - Initiate and maintain comfort rounds  - Make Fall Risk Sign visible to staff  - Offer Toileting every 2 Hours, in advance of need  - Initiate/Maintain bed alarm  - Apply yellow socks and bracelet for high fall risk patients  - Consider moving patient to room near nurses station  Outcome: Progressing     Problem: Prexisting or High Potential for Compromised Skin Integrity  Goal: Skin integrity is maintained or improved  Description: INTERVENTIONS:  - Identify patients at risk for skin breakdown  - Assess and monitor skin integrity  - Assess and monitor nutrition and hydration status  - Monitor labs   - Assess for incontinence   - Turn and reposition patient  - Assist with mobility/ambulation  - Relieve pressure over bony prominences  - Avoid friction and shearing  - Provide appropriate hygiene as needed including keeping skin clean and dry  - Evaluate need for skin moisturizer/barrier cream  - Collaborate with interdisciplinary team   - Patient/family teaching  - Consider wound care consult   Outcome: Progressing     Problem: PAIN - ADULT  Goal: Verbalizes/displays adequate comfort level or baseline comfort level  Description: Interventions:  - Encourage patient to monitor pain and request assistance  - Assess pain using appropriate pain scale  - Administer analgesics based on type and severity of pain and evaluate response  - Implement non-pharmacological measures as appropriate and evaluate response  - Consider cultural and social influences on pain and pain management  -  Notify physician/advanced practitioner if interventions unsuccessful or patient reports new pain  Outcome: Progressing     Problem: INFECTION - ADULT  Goal: Absence or prevention of progression during hospitalization  Description: INTERVENTIONS:  - Assess and monitor for signs and symptoms of infection  - Monitor lab/diagnostic results  - Monitor all insertion sites, i.e. indwelling lines, tubes, and drains  - Monitor endotracheal if appropriate and nasal secretions for changes in amount and color  - [place] appropriate cooling/warming therapies per order  - Administer medications as ordered  - Instruct and encourage patient and family to use good hand hygiene technique  - Identify and instruct in appropriate isolation precautions for identified infection/condition  Outcome: Progressing  Goal: Absence of fever/infection during neutropenic period  Description: INTERVENTIONS:  - Monitor WBC    Outcome: Progressing     Problem: SAFETY ADULT  Goal: Patient will remain free of falls  Description: INTERVENTIONS:  - Educate patient/family on patient safety including physical limitations  - Instruct patient to call for assistance with activity   - Consult OT/PT to assist with strengthening/mobility   - Keep Call bell within reach  - Keep bed low and locked with side rails adjusted as appropriate  - Keep care items and personal belongings within reach  - Initiate and maintain comfort rounds  - Make Fall Risk Sign visible to staff  - Offer Toileting every 2 Hours, in advance of need  - Initiate/Maintain bed alarm  - Apply yellow socks and bracelet for high fall risk patients  - Consider moving patient to room near nurses station  Outcome: Progressing  Goal: Maintain or return to baseline ADL function  Description: INTERVENTIONS:  -  Assess patient's ability to carry out ADLs; assess patient's baseline for ADL function and identify physical deficits which impact ability to perform ADLs (bathing, care of mouth/teeth,  toileting, grooming, dressing, etc.)  - Assess/evaluate cause of self-care deficits   - Assess range of motion  - Assess patient's mobility; develop plan if impaired  - Assess patient's need for assistive devices and provide as appropriate  - Encourage maximum independence but intervene and supervise when necessary  - Involve family in performance of ADLs  - Assess for home care needs following discharge   - Consider OT consult to assist with ADL evaluation and planning for discharge  - Provide patient education as appropriate  Outcome: Progressing  Goal: Maintains/Returns to pre admission functional level  Description: INTERVENTIONS:  - Perform AM-PAC 6 Click Basic Mobility/ Daily Activity assessment daily.  - Set and communicate daily mobility goal to care team and patient/family/caregiver.   - Collaborate with rehabilitation services on mobility goals if consulted  - Perform Range of Motion 3 times a day.  - Reposition patient every 2 hours.  - Out of bed for toileting  - Record patient progress and toleration of activity level   Outcome: Progressing     Problem: DISCHARGE PLANNING  Goal: Discharge to home or other facility with appropriate resources  Description: INTERVENTIONS:  - Identify barriers to discharge w/patient and caregiver  - Arrange for needed discharge resources and transportation as appropriate  - Identify discharge learning needs (meds, wound care, etc.)  - Arrange for interpretive services to assist at discharge as needed  - Refer to Case Management Department for coordinating discharge planning if the patient needs post-hospital services based on physician/advanced practitioner order or complex needs related to functional status, cognitive ability, or social support system  Outcome: Progressing     Problem: Knowledge Deficit  Goal: Patient/family/caregiver demonstrates understanding of disease process, treatment plan, medications, and discharge instructions  Description: Complete learning  assessment and assess knowledge base.  Interventions:  - Provide teaching at level of understanding  - Provide teaching via preferred learning methods  Outcome: Progressing     Problem: GENITOURINARY - ADULT  Goal: Maintains or returns to baseline urinary function  Description: INTERVENTIONS:  - Assess urinary function  - Encourage oral fluids to ensure adequate hydration if ordered  - Administer IV fluids as ordered to ensure adequate hydration  - Administer ordered medications as needed  - Offer frequent toileting  - Follow urinary retention protocol if ordered  Outcome: Progressing  Goal: Absence of urinary retention  Description: INTERVENTIONS:  - Assess patient’s ability to void and empty bladder  - Monitor I/O  - Bladder scan as needed  - Discuss with physician/AP medications to alleviate retention as needed  - Discuss catheterization for long term situations as appropriate  Outcome: Progressing  Goal: Urinary catheter remains patent  Description: INTERVENTIONS:  - Assess patency of urinary catheter  - If patient has a chronic to, consider changing catheter if non-functioning  - Follow guidelines for intermittent irrigation of non-functioning urinary catheter  Outcome: Progressing     Problem: METABOLIC, FLUID AND ELECTROLYTES - ADULT  Goal: Electrolytes maintained within normal limits  Description: INTERVENTIONS:  - Monitor labs and assess patient for signs and symptoms of electrolyte imbalances  - Administer electrolyte replacement as ordered  - Monitor response to electrolyte replacements, including repeat lab results as appropriate  - Instruct patient on fluid and nutrition as appropriate  Outcome: Progressing

## 2024-11-13 NOTE — OP NOTE
OPERATIVE REPORT  PATIENT NAME: Tiana De La Cruz    :  1945  MRN: [id number]  Pt Location: [facility] OR ROOM 01    SURGERY DATE: 2024    Surgeons and Role:     * Delvin To MD - Primary    Preop Diagnosis:  Right ureteral stone with hydronephrosis and sepsis  Postoperative diagnosis:  Right ureteral stone with hydronephrosis and sepsis      Procedure(s):  Right - CYSTOSCOPY RETROGRADE PYELOGRAM WITH INSERTION STENT URETERAL    Specimen(s):  * No specimens in log *    Estimated Blood Loss:   Minimal    Drains:  Ureteral Internal Stent Right ureter 6 Fr. (Active)   Number of days: 0       Anesthesia Type:   General    Operative Indications:  Right ureteral stone with hydronephrosis and sepsis    Operative Findings:  See operative note below      Complications:   None    Procedure and Technique:  I saw the patient in the holding area and reviewed her CT a right ureteral stone right hydronephrosis and septic condition.  The patient was taken to the operating room identified by the surgeon placed on the operating table in the dorsolithotomy position prepped and draped in usual sterile fashion after induction of general LMA anesthesia and appropriate timeout.  A 22 Belarusian cystoscope with 30 and 70 degree lenses was used to perform cystourethroscopy which revealed a normal urethra without stricture or lesion.  The bladder was thick-walled trabeculated severely with posterior wall cellules and diverticular formation.  Ureteral orifices were both in normal position and configuration with clear E flux seen from the left side but not the right.  A 0.035 Nicola-coated floppy tip guidewire was inserted through the cystoscope and retrograde up the right ureteral orifice and under fluoroscopic control into the renal pelvis proximally.  A 6 [place]ian variable length contour stent was then placed over the wire with 3 curls positioned within the renal pelvis.  The wire was removed and contrast was injected confirming  positioning proximally in the renal pelvis.  At this point the release sleeve was inserted and the stent was internalized with 1 curl in the bladder and 3 curls in the renal pelvis.  The bladder was emptied of all fluid and all equipment was removed from the urinary tract except for the right ureteral stent.  The patient was extubated transferred to the PACU in good condition having tolerated the procedure well and was discharged from the operating room in stable condition.  There were no complications.   I was present for the entire procedure. and I was present for all critical portions of the procedure.    Patient Disposition:  PACU              SIGNATURE: Delvin To MD  DATE: November 13, 2024  TIME: 3:32 PM

## 2024-11-13 NOTE — OCCUPATIONAL THERAPY NOTE
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    Anxiety    Hypothyroid    Fibromyalgia    Past Medical History  Past Medical History:   Diagnosis Date    Anxiety     Colon polyps     Depression     Fibromyalgia     Hypothyroid     Lumbar spondylosis     Osteoarthritis      Past Surgical History  Past Surgical History:   Procedure Laterality Date    COLONOSCOPY  07/2016    diverticulosis    COLONOSCOPY  04/2019    diverticulosis and hemorrhoids    COLONOSCOPY  2015    DR AUGUSTINE/TUBULAR ADENOMA    EGD  04/2019    erosive gastropathy, and 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    EGD  04/23/2021    EGD SL/   GASTRITIS    HYSTERECTOMY          11/13/24 0955   OT Last Visit   OT Visit Date 11/13/24   Note Type   Note type Evaluation   Pain Assessment   Pain Assessment Tool FLACC   Pain Rating: FLACC (Rest) - Face 0   Pain Rating: FLACC (Rest) - Legs 0   Pain Rating: FLACC (Rest) - Activity 0   Pain Rating: FLACC (Rest) - Cry 0   Pain Rating: FLACC (Rest) - Consolability 0   Score: FLACC (Rest) 0   Pain Rating: FLACC (Activity) - Face 1   Pain Rating: FLACC (Activity) - Legs 1   Pain Rating: FLACC (Activity) - Activity 0   Pain Rating: FLACC (Activity) - Cry 1   Pain Rating: FLACC (Activity) - Consolability 0   Score: FLACC (Activity) 3   Restrictions/Precautions   Weight Bearing Precautions Per Order No   Other Precautions Impulsive;Cognitive;Chair Alarm;Bed Alarm;Multiple lines;Fall Risk   Home Living   Type of Home SNF   Prior Function   Level of [place] Needs assistance with ADLs;Needs assistance with functional mobility;Needs assistance with IADLS   Lives With Facility staff   Receives Help From Personal care attendant   IADLs  "Family/Friend/Other provides transportation;Family/Friend/Other provides meals;Family/Friend/Other provides medication management   Vocational Retired   Comments Pt a poor historian. From SNF. Reports 2 people assist her for OOB, denies use of mechanical lift.   Lifestyle   [employer] PTA, pt resides in a SNF. Reports he is Ax2 for OOB, denies use of mechanical lift. Assumed (A) w ADLs.   Reciprocal Relationships Pt has a son that lives locally   Service to Others Retired   General   Additional Pertinent History Comorbidities affecting pt’s functional performance include a significant PMH of: anxiety, fibromyalgia, R hip pain, back pain, IBS, abnormal CT of liver.  Patient with active OT orders and activity orders for Up and OOB as tolerated .   Family/Caregiver Present No   Subjective   Subjective \"I just want water\" Agreeable to OT/PT co-eval.   ADL   Where Assessed Edge of bed   Eating Assistance 5  Supervision/Setup   Grooming Assistance 4  Minimal Assistance   UB Bathing Assistance 4  Minimal Assistance   LB Bathing Assistance 2  Maximal Assistance   UB Dressing Assistance 3  Moderate Assistance   LB Dressing Assistance 1  Total Assistance   Toileting Assistance  1  Total Assistance   Bed Mobility   Supine to Sit 3  Moderate assistance   Additional items Assist x 2;HOB elevated;Bedrails;Increased time required;Verbal cues;LE management;Other  (trunk)   Transfers   Sit to Stand 4  Minimal assistance   Additional items Assist x 1;Verbal cues;Other  (RW)   Stand to Sit 3  Moderate assistance   Additional items Assist x 2;Increased time required;Verbal cues;Other  (RW)   Functional Mobility   Functional Mobility 3  Moderate assistance   Additional Comments Ax2, fearful t/o. Bed>chair   Additional items Rolling walker   Balance   Static Sitting Fair -   Dynamic Sitting Poor +   Static Standing   (Poor+/Poor - retropulsive likely due to fear of falling.)   Ambulatory Zero  (Ax2)   Activity Tolerance   Activity " Tolerance Treatment limited secondary to medical complications (Comment);Other (Comment)  (anxiety related to mobility)   Medical Staff Made Aware Florinda PT   Nurse Made Aware SB RN   RUE Assessment   RUE Assessment WFL   LUE Assessment   LUE Assessment WFL   Hand Function   Gross Motor Coordination Functional   Fine Motor Coordination Functional   Sensation   Light Touch No apparent deficits   Vision - Complex Assessment   Ocular Range of Motion Intact   Psychosocial   Patient Behaviors/Mood Anxious;Flat affect   Perception   Inattention/Neglect Appears intact   Cognition   Overall Cognitive Status Impaired   Arousal/Participation Arousable;Responsive   Attention Attends with cues to redirect   Orientation Level Oriented to person;Disoriented to place  (Unable to state the month, but reports it is 2024. Requires increased time/choices for birth year.)    H/o dementia   Memory Decreased recall of recent events;Decreased short term memory;Decreased recall of precautions;Decreased recall of biographical information   Following Commands Follows one step commands with increased time or repetition   Assessment   Limitation Decreased ADL status;Decreased UE strength;Decreased cognition;Decreased Safe judgement during ADL;Decreased endurance;Decreased self-care trans;Decreased high-level ADLs  (dec balance, functional reach, coordination)   Prognosis Fair   Assessment Patient is a 78 y.o. year old female seen for OT eval s/p admit to [facility] on 11/12/2024 with hydronephrosis with ureteral calculus, DVT, disruption of perineal wound in female.  OT consulted to assess ADLs/IADLs/functional mobility and assist w/ D/C planning. Patient demonstrates the following deficits impacting occupational performance: decreased strength , decreased balance, decreased activity tolerance, limited functional reach, impaired GMC, impaired FMC, impaired memory, impaired sequencing, impaired problem solving, impulsivity, decreased safety  awareness, dizziness, increased body habitus , impaired coordination, and decreased cardiovascular endurance. These impairments, as well at pt’s difficulty performing ADLs, difficulty performing IADLs, difficulty performing transfers/mobility, limited insight into deficits, compliance, fall risk , functional decline , increased reliance on DME , advanced age, and multiple admissions , limit pt’s ability to safely engage in all baseline areas of occupation. Pt's CLOF as follows: eating/grooming: supervision  and Heriberto, UB ADLs: Heriberto and modA, LB ADLs: maxA and dependent, toileting: dependent, bed mobility: modAx2, functional transfers: [place] and modAx2, functional mobility: modAx2, sitting/standing tolerance: ~4 min. Pt would benefit from continued skilled OT while in acute setting to address deficits as defined above and to maximize (I) w/ ADLs/functional mobility. Occupational performance areas to address include: grooming, bathing/shower, toilet hygiene, dressing, socialization, and functional mobility. Based on the aforementioned evaluation, functional performance deficits, and assessments, pt has been identified as a high complexity evaluation. At this time, recommendation for pt to receive post-acute rehabilitation services at a Level II (moderate resource intensity) due to above deficits and CLOF. OT will continue to follow pt 2-5x/wk to address the goals listed below to  w/in 10-14 days.    Sitting EOB: 127/73  Post transfer: 107/74  Reports persistent dizziness t/o   Goals   Patient Goals to drink water   LTG Time Frame 10-14   Plan   Treatment Interventions ADL retraining;Functional transfer training;UE strengthening/ROM;Endurance training;Cognitive reorientation;Patient/family training;Equipment evaluation/education;Neuromuscular reeducation;Compensatory technique education;Continued evaluation;Energy conservation;Activityengagement   Goal Expiration Date 24   OT Treatment Day 0   OT Frequency  3-5x/wk;2-3x/wk   Discharge Recommendation   Rehab Resource Intensity Level, OT II (Moderate Resource Intensity)   Additional Comments  Co treatment with PT secondary to complex medical condition of pt, possible A of 2 required to achieve and maintain transitional movements, requiring the need of skilled therapeutic intervention of 2 therapists to achieve delivery of services.   AM-PAC Daily Activity Inpatient   Lower Body Dressing 1   Bathing 2   Toileting 1   Upper Body Dressing 2   Grooming 3   Eating 3   Daily Activity Raw Score 12   Daily Activity Standardized Score (Calc for Raw Score >=11) 30.6   AM-PAC Applied Cognition Inpatient   Following a Speech/Presentation 2   Understanding Ordinary Conversation 2   Taking Medications 1   Remembering Where Things Are Placed or Put Away 1   Remembering List of 4-5 Errands 1   Taking Care of Complicated Tasks 1   Applied Cognition Raw Score 8   Applied Cognition Standardized Score 19.32     Occupational Therapy goals: In 7-14 days:     1- Patient will verbalize and demonstrate use of energy conservation/deep breathing technique and work simplification skills during functional activity with no verbal cues.   2- Patient will verbalize and demonstrate good body mechanics and joint protection techniques during ADLs/IADLs with no verbal cues   3- Pt will complete bed mobility at a ModA level w/ G balance/safety demonstrated to decrease caregiver assistance required   4- Patient will increase OOB/ sitting tolerance to 2-4 hours per day for increased participation in self care and leisure tasks with no s/s of exertion.   5-Patient will increase standing tolerance time to 5 minutes with unilateral UE support to complete sink level ADLs@ Heriberto level    6- Pt will improve functional transfers to ModA on/off all surfaces using DME as needed w/ G balance/safety   7- Patient will complete UB ADLs with S utilizing appropriate DME/AE PRN   8- Patient will complete LB ADLs with modA  utilizing appropriate DME/AE PRN   9- Patient will complete toileting tasks with modA with G hygiene/thoroughness utilizing appropriate DME/AE PRN   10- Pt will improve functional mobility during ADL/IADL/leisure tasks to [place] using DME as needed w/ G balance/safety    11- Pt will increase BUE strength by 1MM grade via AROM/AAROM/PROM exercises to increase independence in ADLs and transfers       Sydney Eid, OTR/L

## 2024-11-13 NOTE — ASSESSMENT & PLAN NOTE
Bilateral DVT on heparin infusion likely related to malignancy  Will plan on starting oral anticoagulation post urologic procedure

## 2024-11-13 NOTE — PLAN OF CARE
Problem: PHYSICAL THERAPY ADULT  Goal: Performs mobility at highest level of function for planned discharge setting.  See evaluation for individualized goals.  Description: Treatment/Interventions: Functional transfer training, LE strengthening/ROM, Therapeutic exercise, Cognitive reorientation, Patient/family training, Equipment eval/education, Bed mobility, Gait training, Continued evaluation, Spoke to nursing, OT          See flowsheet documentation for full assessment, interventions and recommendations.  Note: Prognosis: Fair  Problem List: Decreased strength, Impaired balance, Decreased mobility, Impaired judgement, Decreased cognition, Decreased safety awareness, Obesity, Pain  Assessment: Tiana De La Cruz is a 78 y.o. female admitted to [facility] on 11/12/2024 for Hydronephrosis with ureteral calculus. PT was consulted and pt was seen on 11/13/2024 for mobility assessment and d/c planning. Pt presents w high fall risk. Resides at SNF. Per patient Ax2 to transf OOB however would clarify given poor historian secondary to dementia. Pt is currently functioning at a max Ax2 for bed mobility primarily dt decreased effort as well as weakness; min Ax1-2 to stand dt impulsivity, weakness; min Ax2 to take steps bed>chair w RW for safety dt cognition and impulsivity; mod Ax2 to sit dt poorly controlled descent and impulsivity. Upon standing pt w sudden anxiety and fearfulness of falling- redirectable w cuing. Unable to ambulate any significant distance dt sx and safety concerns from impulsivity. Will maintain on caseload to optimize mobility during hospital stay. Can re-evaluate POC based on pt baseline LOF. Recommend return to SNF w PT services as appropriate.  Barriers to Discharge: None     Rehab Resource Intensity Level, PT: II (Moderate Resource Intensity) (return to SNF. PT as appropriate)    See flowsheet documentation for full assessment.

## 2024-11-14 ENCOUNTER — TELEPHONE (OUTPATIENT)
Dept: OTHER | Facility: HOSPITAL | Age: 79
End: 2024-11-14

## 2024-11-14 ENCOUNTER — PREP FOR PROCEDURE (OUTPATIENT)
Dept: OTHER | Facility: HOSPITAL | Age: 79
End: 2024-11-14

## 2024-11-14 DIAGNOSIS — N20.1 URETEROLITHIASIS: Primary | ICD-10-CM

## 2024-11-14 PROBLEM — E87.6 HYPOKALEMIA: Status: ACTIVE | Noted: 2024-11-14

## 2024-11-14 LAB
ALBUMIN SERPL BCG-MCNC: 2.7 G/DL (ref 3.5–5)
ALP SERPL-CCNC: 106 U/L (ref 34–104)
ALT SERPL W P-5'-P-CCNC: 7 U/L (ref 7–52)
ANION GAP SERPL CALCULATED.3IONS-SCNC: 6 MMOL/L (ref 4–13)
AST SERPL W P-5'-P-CCNC: 12 U/L (ref 13–39)
BACTERIA UR CULT: ABNORMAL
BACTERIA UR CULT: ABNORMAL
BILIRUB SERPL-MCNC: 0.29 MG/DL (ref 0.2–1)
BUN SERPL-MCNC: 15 MG/DL (ref 5–25)
CALCIUM ALBUM COR SERPL-MCNC: 9.9 MG/DL (ref 8.3–10.1)
CALCIUM SERPL-MCNC: 8.9 MG/DL (ref 8.4–10.2)
CHLORIDE SERPL-SCNC: 107 MMOL/L (ref 96–108)
CO2 SERPL-SCNC: 26 MMOL/L (ref 21–32)
CREAT SERPL-MCNC: 0.68 MG/DL (ref 0.6–1.3)
ERYTHROCYTE [DISTWIDTH] IN BLOOD BY AUTOMATED COUNT: 14.5 % (ref 11.6–15.1)
GFR SERPL CREATININE-BSD FRML MDRD: 83 ML/MIN/1.73SQ M
GLUCOSE SERPL-MCNC: 67 MG/DL (ref 65–140)
HCT VFR BLD AUTO: 32.5 % (ref 34.8–46.1)
HGB BLD-MCNC: 10.6 G/DL (ref 11.5–15.4)
MCH RBC QN AUTO: 28.6 PG (ref 26.8–34.3)
MCHC RBC AUTO-ENTMCNC: 32.6 G/DL (ref 31.4–37.4)
MCV RBC AUTO: 88 FL (ref 82–98)
PLATELET # BLD AUTO: 237 THOUSANDS/UL (ref 149–390)
PMV BLD AUTO: 10 FL (ref 8.9–12.7)
POTASSIUM SERPL-SCNC: 2.8 MMOL/L (ref 3.5–5.3)
PROCALCITONIN SERPL-MCNC: 66.86 NG/ML
PROT SERPL-MCNC: 5.2 G/DL (ref 6.4–8.4)
RBC # BLD AUTO: 3.71 MILLION/UL (ref 3.81–5.12)
SODIUM SERPL-SCNC: 139 MMOL/L (ref 135–147)
WBC # BLD AUTO: 18.6 THOUSAND/UL (ref 4.31–10.16)

## 2024-11-14 PROCEDURE — 99232 SBSQ HOSP IP/OBS MODERATE 35: CPT | Performed by: INTERNAL MEDICINE

## 2024-11-14 PROCEDURE — 97110 THERAPEUTIC EXERCISES: CPT

## 2024-11-14 PROCEDURE — 85027 COMPLETE CBC AUTOMATED: CPT | Performed by: INTERNAL MEDICINE

## 2024-11-14 PROCEDURE — 80053 COMPREHEN METABOLIC PANEL: CPT | Performed by: INTERNAL MEDICINE

## 2024-11-14 PROCEDURE — 84145 PROCALCITONIN (PCT): CPT | Performed by: INTERNAL MEDICINE

## 2024-11-14 PROCEDURE — 97530 THERAPEUTIC ACTIVITIES: CPT

## 2024-11-14 RX ORDER — POTASSIUM CHLORIDE 1500 MG/1
40 TABLET, EXTENDED RELEASE ORAL EVERY 4 HOURS
Status: COMPLETED | OUTPATIENT
Start: 2024-11-14 | End: 2024-11-14

## 2024-11-14 RX ADMIN — ACETAMINOPHEN 325MG 975 MG: 325 TABLET ORAL at 22:01

## 2024-11-14 RX ADMIN — APIXABAN 10 MG: 5 TABLET, FILM COATED ORAL at 17:36

## 2024-11-14 RX ADMIN — ACETAMINOPHEN 325MG 975 MG: 325 TABLET ORAL at 05:15

## 2024-11-14 RX ADMIN — CEFTRIAXONE 1000 MG: 1 INJECTION, POWDER, FOR SOLUTION INTRAMUSCULAR; INTRAVENOUS at 00:49

## 2024-11-14 RX ADMIN — POTASSIUM CHLORIDE 40 MEQ: 1500 TABLET, EXTENDED RELEASE ORAL at 22:01

## 2024-11-14 RX ADMIN — ACETAMINOPHEN 325MG 975 MG: 325 TABLET ORAL at 15:34

## 2024-11-14 RX ADMIN — APIXABAN 10 MG: 5 TABLET, FILM COATED ORAL at 09:11

## 2024-11-14 RX ADMIN — LIDOCAINE 1 PATCH: 50 PATCH TOPICAL at 09:12

## 2024-11-14 RX ADMIN — LEVOTHYROXINE SODIUM 75 MCG: 75 TABLET ORAL at 05:07

## 2024-11-14 RX ADMIN — OXCARBAZEPINE 150 MG: 150 TABLET, FILM COATED ORAL at 17:35

## 2024-11-14 RX ADMIN — AMLODIPINE BESYLATE 10 MG: 10 TABLET ORAL at 09:11

## 2024-11-14 RX ADMIN — VENLAFAXINE HYDROCHLORIDE 75 MG: 75 CAPSULE, EXTENDED RELEASE ORAL at 09:12

## 2024-11-14 RX ADMIN — OXCARBAZEPINE 150 MG: 150 TABLET, FILM COATED ORAL at 09:12

## 2024-11-14 RX ADMIN — POTASSIUM CHLORIDE 40 MEQ: 1500 TABLET, EXTENDED RELEASE ORAL at 17:37

## 2024-11-14 NOTE — PLAN OF CARE
Problem: Potential for Falls  Goal: Patient will remain free of falls  Description: INTERVENTIONS:  - Educate patient/family on patient safety including physical limitations  - Instruct patient to call for assistance with activity   - Consult OT/PT to assist with strengthening/mobility   - Keep Call bell within reach  - Keep bed low and locked with side rails adjusted as appropriate  - Keep care items and personal belongings within reach  - Initiate and maintain comfort rounds  - Make Fall Risk Sign visible to staff  - Offer Toileting every 2 Hours, in advance of need  - Initiate/Maintain bed alarm  - Obtain necessary fall risk management equipment: yellow socks  - Apply yellow socks and bracelet for high fall risk patients  - Consider moving patient to room near nurses station  Outcome: Progressing     Problem: Prexisting or High Potential for Compromised Skin Integrity  Goal: Skin integrity is maintained or improved  Description: INTERVENTIONS:  - Identify patients at risk for skin breakdown  - Assess and monitor skin integrity  - Assess and monitor nutrition and hydration status  - Monitor labs   - Assess for incontinence   - Turn and reposition patient  - Assist with mobility/ambulation  - Relieve pressure over bony prominences  - Avoid friction and shearing  - Provide appropriate hygiene as needed including keeping skin clean and dry  - Evaluate need for skin moisturizer/barrier cream  - Collaborate with interdisciplinary team   - Patient/family teaching  - Consider wound care consult   Outcome: Progressing     Problem: PAIN - ADULT  Goal: Verbalizes/displays adequate comfort level or baseline comfort level  Description: Interventions:  - Encourage patient to monitor pain and request assistance  - Assess pain using appropriate pain scale  - Administer analgesics based on type and severity of pain and evaluate response  - Implement non-pharmacological measures as appropriate and evaluate response  - Consider  cultural and social influences on pain and pain management  - Notify physician/advanced practitioner if interventions unsuccessful or patient reports new pain  Outcome: Progressing     Problem: INFECTION - ADULT  Goal: Absence or prevention of progression during hospitalization  Description: INTERVENTIONS:  - Assess and monitor for signs and symptoms of infection  - Monitor lab/diagnostic results  - Monitor all insertion sites, i.e. indwelling lines, tubes, and drains  - Monitor endotracheal if appropriate and nasal secretions for changes in amount and color  - [place] appropriate cooling/warming therapies per order  - Administer medications as ordered  - Instruct and encourage patient and family to use good hand hygiene technique  - Identify and instruct in appropriate isolation precautions for identified infection/condition  Outcome: Progressing  Goal: Absence of fever/infection during neutropenic period  Description: INTERVENTIONS:  - Monitor WBC    Outcome: Progressing     Problem: SAFETY ADULT  Goal: Patient will remain free of falls  Description: INTERVENTIONS:  - Educate patient/family on patient safety including physical limitations  - Instruct patient to call for assistance with activity   - Consult OT/PT to assist with strengthening/mobility   - Keep Call bell within reach  - Keep bed low and locked with side rails adjusted as appropriate  - Keep care items and personal belongings within reach  - Initiate and maintain comfort rounds  - Make Fall Risk Sign visible to staff  - Offer Toileting every 2 Hours, in advance of need  - Initiate/Maintain bed alarm  - Obtain necessary fall risk management equipment: yellow socks  - Apply yellow socks and bracelet for high fall risk patients  - Consider moving patient to room near nurses station  Outcome: Progressing  Goal: Maintain or return to baseline ADL function  Description: INTERVENTIONS:  -  Assess patient's ability to carry out ADLs; assess patient's  baseline for ADL function and identify physical deficits which impact ability to perform ADLs (bathing, care of mouth/teeth, toileting, grooming, dressing, etc.)  - Assess/evaluate cause of self-care deficits   - Assess range of motion  - Assess patient's mobility; develop plan if impaired  - Assess patient's need for assistive devices and provide as appropriate  - Encourage maximum independence but intervene and supervise when necessary  - Involve family in performance of ADLs  - Assess for home care needs following discharge   - Consider OT consult to assist with ADL evaluation and planning for discharge  - Provide patient education as appropriate  Outcome: Progressing  Goal: Maintains/Returns to pre admission functional level  Description: INTERVENTIONS:  - Perform AM-PAC 6 Click Basic Mobility/ Daily Activity assessment daily.  - Set and communicate daily mobility goal to care team and patient/family/caregiver.   - Collaborate with rehabilitation services on mobility goals if consulted  - Perform Range of Motion 3 times a day.  - Reposition patient every 2 hours.  - Dangle patient 3 times a day  - Stand patient 3 times a day  - Ambulate patient 3 times a day  - Out of bed to chair 3 times a day   - Out of bed for meals 3 times a day  - Out of bed for toileting  - Record patient progress and toleration of activity level   Outcome: Progressing     Problem: DISCHARGE PLANNING  Goal: Discharge to home or other facility with appropriate resources  Description: INTERVENTIONS:  - Identify barriers to discharge w/patient and caregiver  - Arrange for needed discharge resources and transportation as appropriate  - Identify discharge learning needs (meds, wound care, etc.)  - Arrange for interpretive services to assist at discharge as needed  - Refer to Case Management Department for coordinating discharge planning if the patient needs post-hospital services based on physician/advanced practitioner order or complex needs  related to functional status, cognitive ability, or social support system  Outcome: Progressing     Problem: Knowledge Deficit  Goal: Patient/family/caregiver demonstrates understanding of disease process, treatment plan, medications, and discharge instructions  Description: Complete learning assessment and assess knowledge base.  Interventions:  - Provide teaching at level of understanding  - Provide teaching via preferred learning methods  Outcome: Progressing     Problem: GENITOURINARY - ADULT  Goal: Maintains or returns to baseline urinary function  Description: INTERVENTIONS:  - Assess urinary function  - Encourage oral fluids to ensure adequate hydration if ordered  - Administer IV fluids as ordered to ensure adequate hydration  - Administer ordered medications as needed  - Offer frequent toileting  - Follow urinary retention protocol if ordered  Outcome: Progressing  Goal: Absence of urinary retention  Description: INTERVENTIONS:  - Assess patient’s ability to void and empty bladder  - Monitor I/O  - Bladder scan as needed  - Discuss with physician/AP medications to alleviate retention as needed  - Discuss catheterization for long term situations as appropriate  Outcome: Progressing  Goal: Urinary catheter remains patent  Description: INTERVENTIONS:  - Assess patency of urinary catheter  - If patient has a chronic to, consider changing catheter if non-functioning  - Follow guidelines for intermittent irrigation of non-functioning urinary catheter  Outcome: Progressing     Problem: METABOLIC, FLUID AND ELECTROLYTES - ADULT  Goal: Electrolytes maintained within normal limits  Description: INTERVENTIONS:  - Monitor labs and assess patient for signs and symptoms of electrolyte imbalances  - Administer electrolyte replacement as ordered  - Monitor response to electrolyte replacements, including repeat lab results as appropriate  - Instruct patient on fluid and nutrition as appropriate  Outcome: Progressing      Problem: SKIN/TISSUE INTEGRITY - ADULT  Goal: Incision(s), wounds(s) or drain site(s) healing without S/S of infection  Description: INTERVENTIONS  - Assess and document dressing, incision, wound bed, drain sites and surrounding tissue  - Provide patient and family education  - Perform skin care/dressing changes every 2 hours/as needed  Outcome: Progressing  Goal: Pressure injury heals and does not worsen  Description: Interventions:  - Implement low air loss mattress or specialty surface (Criteria met)  - Apply silicone foam dressing  - Instruct/assist with weight shifting every 30 minutes when in chair   - Limit chair time to 1 hour intervals  - Use special pressure reducing interventions such as allevyn when in chair   - Apply fecal or urinary incontinence containment device   - Perform passive or active ROM every shift  - Turn and reposition patient & offload bony prominences every 2 hours   - Utilize friction reducing device or surface for transfers   - Consider consults to  interdisciplinary teams such as PTOT  - Use incontinent care products after each incontinent episode such as lotion  - Consider nutrition services referral as needed  Outcome: Progressing     Problem: HEMATOLOGIC - ADULT  Goal: Maintains hematologic stability  Description: INTERVENTIONS  - Assess for signs and symptoms of bleeding or hemorrhage  - Monitor labs  - Administer supportive blood products/factors as ordered and appropriate  Outcome: Progressing

## 2024-11-14 NOTE — ASSESSMENT & PLAN NOTE
Replace with 80 mEq today.  Recheck tomorrow.    Results from last 7 days   Lab Units 11/14/24  0501 11/13/24  0655 11/12/24  1140   POTASSIUM mmol/L 2.8* 3.4* 3.4*

## 2024-11-14 NOTE — QUICK NOTE
Tiana is POD #1 s/p cystoscopy and insertion of right ureteral stent by Dr. To  She continues urinating normally.  VSS, she remains afebrile.  Procalcitonin remains elevated at 66.86.  WBC has begun to decrease at 18 today.  Creatinine remained stable at 0.68.  Urine culture from 11/12 returned positive for E. coli and Proteus Mirabella's bacteria.  Continue antibiotics per internal medicine team.  From urologic perspective, right ureteral stent will be maintained until she can be scheduled for second stage procedure for definitive stone management once she is medially optimized. This plan was discussed with her son (Dennis De La Cruz) preoperatively and he was agreeable.  No further  intervention is needed at this time,  our team will sign off.    Adali Guidry PA-C    Urology will sign off but remain available for any further inpatient needs. Please feel free to contact the provider currently covering the Urology Epic Chat role for this campus with questions or concerns.

## 2024-11-14 NOTE — ASSESSMENT & PLAN NOTE
History of hypothyroidism dementia and seizure disorder who presents to the hospital with fever  Sepsis present admission secondary to Proteus bacteremia urinary source  Underwent cystoscopy/stent placement  Awaiting sensitivities on blood cultures.  Urine culture sensitive to cephalosporins  Continue ceftriaxone

## 2024-11-14 NOTE — PROGRESS NOTES
Progress Note - Hospitalist   Name: Tiana De La Cruz 78 y.o. female I MRN: [id number]  Unit/Bed#: [place] [address] -01 I Date of Admission: 11/12/2024   Date of Service: 11/14/2024 I Hospital Day: 2    Assessment & Plan  Hydronephrosis with ureteral calculus  History of hypothyroidism dementia and seizure disorder who presents to the hospital with fever  Sepsis present admission secondary to Proteus bacteremia urinary source  Underwent cystoscopy/stent placement  Awaiting sensitivities on blood cultures.  Urine culture sensitive to cephalosporins  Continue ceftriaxone  Hypokalemia  Replace with 80 mEq today.  Recheck tomorrow.    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    Mobility:   Basic Mobility Inpatient Raw Score: 9  JH-HLM Goal: 3: Sit at edge of bed  JH-HLM Achieved: 3: Sit at edge of bed  JH-HLM Goal achieved. Continue to encourage appropriate mobility.    Patient Centered Rounds: I have performed bedside rounds with nursing staff today.  Discussions with Specialists or Other Care Team Provider: Case management    Education and Discussions with Family / Patient: Updated  (son) via phone.    Current Length of Stay: 2 day(s)  Current Patient Status: Inpatient   Certification Statement: The patient will continue to require  additional inpatient hospital stay due to awaiting blood culture sensitivities  Discharge Plan: Anticipate discharge in 24-48 hrs to previous nursing facility    Code Status: Level 1 - Full Code    Subjective   Patient seen and examined.  No complaints.  Wants to go home..    Objective   Vitals:   Temp (24hrs), Av.6 °F (37 °C), Min:97.7 °F (36.5 °C), Max:99.9 °F (37.7 °C)    Temp:  [97.7 °F (36.5 °C)-99.9 °F (37.7 °C)] 98 °F (36.7 °C)  HR:  [] 90  Resp:  [15-20] 18  BP: (129-164)/(64-98) 129/64  SpO2:  [92 %-97 %] 92 %  Body mass index is 25 kg/m².     Input and Output Summary (last 24 hours):     Intake/Output Summary (Last 24 hours) at 2024 1732  Last data filed at 2024 1518  Gross per 24 hour   Intake 940 ml   Output --   Net 940 ml       Physical Exam  Vitals reviewed.   Constitutional:       General: She is not in acute distress.     Appearance: Normal appearance.   HENT:      Head: Atraumatic.   Eyes:      General: No scleral icterus.  Cardiovascular:      Rate and Rhythm: Regular rhythm.   Pulmonary:      Breath sounds: Decreased breath sounds present. No wheezing.   Abdominal:      General: Bowel sounds are normal.      Palpations: Abdomen is soft.      Tenderness: There is no guarding or rebound.   Musculoskeletal:         General: No swelling.   Skin:     General: Skin is warm.      Coloration: Skin is not jaundiced.   Neurological:      Mental Status: She is alert and oriented to person, place, and time. Mental status is at baseline.   Psychiatric:         Mood and Affect: Mood normal.       Lines/Drains:  Invasive Devices       Peripheral Intravenous Line  Duration             Peripheral IV 24 Left;Proximal;Ventral (anterior) Forearm 2 days              Drain  Duration             Ureteral Internal Stent Right ureter 6 Fr. 1 day                            Lab Results: I have reviewed the following results:   Results from last 7 days   Lab Units 24  0508 24  4115  11/12/24  1140   WBC Thousand/uL 18.60* 28.33* 15.05*   HEMOGLOBIN g/dL 10.6* 11.0* 12.5   PLATELETS Thousands/uL 237 264 321   MCV fL 88 88 87   TOTAL NEUT ABS Thousand/uL  --  24.93* 14.60*   BANDS PCT %  --  15* 9*   INR   --   --  1.32*     Results from last 7 days   Lab Units 11/14/24  0501 11/13/24  0655 11/12/24  1140   SODIUM mmol/L 139 141 136   POTASSIUM mmol/L 2.8* 3.4* 3.4*   CHLORIDE mmol/L 107 108 100   CO2 mmol/L 26 26 25   ANION GAP mmol/L 6 7 11   BUN mg/dL 15 18 15   CREATININE mg/dL 0.68 0.87 1.48*   CALCIUM mg/dL 8.9 8.9 10.1   ALBUMIN g/dL 2.7* 2.8* 3.5   TOTAL BILIRUBIN mg/dL 0.29 0.29 0.75   ALK PHOS U/L 106* 104 143*   ALT U/L 7 6* 4*   AST U/L 12* 11* 7*   EGFR ml/min/1.73sq m 83 63 33   GLUCOSE RANDOM mg/dL 67 84 100     Results from last 7 days   Lab Units 11/13/24  0655   MAGNESIUM mg/dL 1.7*         Results from last 7 days   Lab Units 11/12/24  1337 11/12/24  1140   HS TNI 0HR ng/L  --  9   HS TNI 2HR ng/L <2  --           Results from last 7 days   Lab Units 11/14/24  0501 11/12/24  1337 11/12/24  1140   LACTIC ACID mmol/L  --  0.7 4.8*   PROCALCITONIN ng/ml 66.86*  --   --                    Recent Cultures (last 7 days):   Results from last 7 days   Lab Units 11/12/24  1255 11/12/24  1248 11/12/24  1244   BLOOD CULTURE  Proteus mirabilis* Proteus mirabilis*  --    GRAM STAIN RESULT  Gram negative rods* Gram negative rods*  --    URINE CULTURE   --   --  >100,000 cfu/ml Escherichia coli*  Proteus mirabilis*       Imaging:  Reviewed radiology reports from this admission including:      CT chest abdomen pelvis w contrast  Result Date: 11/12/2024  Impression: 1. 7 mm right proximal ureteral calculus with moderate hydronephrosis and hydroureter. There is associated urothelial thickening and enhancement suggesting associated ureteritis 2. Left iliac and right common femoral vein DVT suspected. Recommend further characterization with DVT ultrasound. 3. Left peroneal defect may correspond  to the wound. No fluid collection seen deep to this wound. I personally discussed this study with GRAY CRESPO on 11/12/2024 3:35 PM. Workstation performed: [id number]     XR chest 1 view portable  Result Date: 11/12/2024  Impression: Mild bibasilar opacity which could be due to atelectasis but pneumonia not excluded in the appropriate clinical setting. Workstation performed: [id number]       Last 24 Hours Medication List:     Current Facility-Administered Medications:     acetaminophen (TYLENOL) tablet 975 mg, Q8H    amLODIPine (NORVASC) tablet 10 mg, Daily    apixaban (ELIQUIS) tablet 10 mg, BID **FOLLOWED BY** [START ON 11/20/2024] apixaban (ELIQUIS) tablet 5 mg, BID    ceftriaxone (ROCEPHIN) 1 g/50 mL in dextrose IVPB, Q24H, Last Rate: Stopped (11/14/24 0149)    levothyroxine tablet 75 mcg, Early Morning    lidocaine (LIDODERM) 5 % patch 1 patch, Daily    ondansetron (ZOFRAN) injection 4 mg, Once PRN    OXcarbazepine (TRILEPTAL) tablet 150 mg, BID    venlafaxine (EFFEXOR-XR) 24 hr capsule 75 mg, Daily    Administrative Statements   Today, Patient Was Seen By: Soto Da Silva, DO  I have spent a total time of 35 minutes in caring for this patient on the day of the visit/encounter including Diagnostic results, Patient and family education, Documenting in the medical record, Reviewing / ordering tests, medicine, procedures  , Obtaining or reviewing history  , and Communicating with other healthcare professionals .    **Please Note: This note may have been constructed using a voice recognition system.**

## 2024-11-14 NOTE — UTILIZATION REVIEW
SEE INITIAL REVIEW AT BOTTOM      Continued Stay Review    Date: 11/14                          Current Patient Class: Inpatient  Current Level of Care: MS     HPI:78 y.o. female initially admitted on 11/12 11/13   OP NOTE    Right - CYSTOSCOPY RETROGRADE PYELOGRAM WITH INSERTION STENT URETERAL     Date: 11/14     Day 3: Has surpassed a 2nd midnight with active treatments and services.  POD #1 s/p cystoscopy and insertion of right ureteral stent.  continues urinating normally.  VSS, she remains afebrile.  Procalcitonin remains elevated at 66.86.  WBC has begun to decrease at 18 today.  Creatinine remained stable at 0.68.  Urine culture from 11/12 returned positive for E. coli and Proteus Mirabella's bacteria.  Continue antibiotics  right ureteral stent will be maintained until she can be scheduled for second stage procedure for definitive stone management once she is medially optimized.     Medications:   Scheduled Medications:  acetaminophen, 975 mg, Oral, Q8H  amLODIPine, 10 mg, Oral, Daily  apixaban, 10 mg, Oral, BID   Followed by  [START ON 11/20/2024] apixaban, 5 mg, Oral, BID  cefTRIAXone, 1,000 mg, Intravenous, Q24H  levothyroxine, 75 mcg, Oral, Early Morning  lidocaine, 1 patch, Topical, Daily  OXcarbazepine, 150 mg, Oral, BID  venlafaxine, 75 mg, Oral, Daily      Continuous IV Infusions:     PRN Meds:    Discharge Plan: tbd    Vital Signs (last 3 days)       Date/Time Temp Pulse Resp BP MAP (mmHg) SpO2 Nasal Cannula O2 Flow Rate (L/min) O2 Device Kai Coma Scale Score Pain    11/14/24 0800 -- -- -- -- -- -- -- None (Room air) -- --    11/14/24 0749 -- -- -- -- -- -- -- None (Room air) -- No Pain    11/14/24 07:29:44 97.7 °F (36.5 °C) 100 15 162/71 101 92 % -- None (Room air) -- --    11/14/24 0515 -- -- -- -- -- -- -- -- -- 8    11/14/24 05:03:37 98.5 °F (36.9 °C) 97 20 158/73 101 97 % -- -- -- --    11/14/24 00:16:27 -- 98 18 137/71 93 95 % -- -- -- --    11/13/24 2300 -- -- -- -- -- 95 % 2 L/min  Nasal cannula 14 No Pain    11/13/24 22:50:34 -- 109 20 147/79 102 93 % -- -- -- --    11/13/24 22:15:34 99.9 °F (37.7 °C) 96 18 164/82 109 95 % -- None (Room air) -- --    11/13/24 2145 -- -- -- -- -- -- -- None (Room air) -- No Pain    11/13/24 2110 -- 108 18 162/76 105 95 % -- -- -- --    11/13/24 20:08:01 98.7 °F (37.1 °C) 108 20 156/78 104 95 % -- -- -- --    11/13/24 19:12:56 -- 95 -- 140/98 112 95 % -- -- -- --    11/13/24 17:01:17 98.2 °F (36.8 °C) 88 -- 149/77 101 91 % -- -- -- --    11/13/24 1628 97.6 °F (36.4 °C) 76 20 154/71 102 95 % -- None (Room air) -- No Pain    11/13/24 1621 -- 74 14 170/72 -- 94 % -- None (Room air) -- No Pain    11/13/24 1605 -- 80 21 -- -- 97 % -- None (Room air) -- --    11/13/24 1557 97 °F (36.1 °C) 74 12 143/65 93 97 % 3 L/min Nasal cannula -- No Pain    11/13/24 1541 97 °F (36.1 °C) 71 12 142/65 93 97 % 3 L/min Nasal cannula -- --    11/13/24 1405 -- -- -- -- -- -- -- -- -- No Pain    11/13/24 09:54:18 -- 96 -- 107/74 85 96 % -- -- -- --    11/13/24 09:46:32 -- 93 -- 127/73 91 95 % -- -- -- --    11/13/24 0900 -- -- -- -- -- 95 % -- None (Room air) 14 No Pain    11/13/24 07:25:38 99.2 °F (37.3 °C) 94 18 158/66 97 94 % -- None (Room air) -- --    11/13/24 0627 -- -- -- -- -- -- -- -- -- 10 - Worst Possible Pain    11/13/24 06:10:17 99.8 °F (37.7 °C) 86 -- 137/62 87 94 % -- -- -- --    11/12/24 22:54:51 97.6 °F (36.4 °C) 92 19 102/71 81 96 % -- None (Room air) -- --    11/12/24 2240 -- -- -- -- -- -- -- -- -- No Pain    11/12/24 2200 -- -- -- -- -- -- -- -- 14 No Pain    11/12/24 21:17:25 98.4 °F (36.9 °C) 85 20 132/92 105 92 % -- -- -- --    11/12/24 1756 -- -- -- -- -- 92 % -- None (Room air) 13 No Pain    11/12/24 17:37:40 97.9 °F (36.6 °C) 92 -- 141/72 95 94 % -- -- -- --    11/12/24 1700 -- 87 18 131/59 85 93 % -- None (Room air) -- --    11/12/24 1551 99.7 °F (37.6 °C) -- -- -- -- -- -- -- -- --    11/12/24 1530 -- 93 18 139/64 92 93 % -- -- -- --    11/12/24 1515 -- 98  18 140/67 96 96 % -- None (Room air) -- --    11/12/24 1500 -- 96 18 143/68 98 96 % -- None (Room air) -- --    11/12/24 1400 -- 94 24 140/73 99 94 % -- None (Room air) -- No Pain    11/12/24 1331 -- 96 18 129/63 -- 93 % -- None (Room air) -- --    11/12/24 1238 101.9 °F (38.8 °C)  -- -- -- -- -- -- -- -- --    11/12/24 1230 -- 116 18 143/73 97 95 % -- None (Room air) -- --    11/12/24 1214 -- -- -- -- -- -- -- -- 13 --    11/12/24 1132 98.9 °F (37.2 °C) 80 19 123/60 -- 95 % -- None (Room air) -- --          Weight (last 2 days)       Date/Time Weight    11/12/24 22:54:51 72.4 (159.61)    11/12/24 1349 72.4 (159.61)    11/12/24 1132 72.4 (159.61)            Pertinent Labs/Diagnostic Results:   Radiology:  FL retrograde pyelogram   Final Interpretation by Jamil Tracey DO (11/13 1641)      Fluoroscopic guidance provided for right retrograde pyelogram.      Please see separate procedure report for additional details.         Workstation performed: [id number]          VAS VENOUS DUPLEX - LOWER LIMB BILATERAL   Final Interpretation by Rudi Castro MD (11/12 6286)      CT chest abdomen pelvis w contrast   Final Interpretation by Yimi Lindsey MD (11/12 2596)         1. 7 mm right proximal ureteral calculus with moderate hydronephrosis and hydroureter. There is associated urothelial thickening and enhancement suggesting associated ureteritis   2. Left iliac and right common femoral vein DVT suspected. Recommend further characterization with DVT ultrasound.   3. Left peroneal defect may correspond to the wound. No fluid collection seen deep to this wound.         I personally discussed this study with GRAY CRESPO on 11/12/2024 3:35 PM.               Workstation performed: [id number]         XR chest 1 view portable   Final Interpretation by Charlene King MD (11/12 1244)      Mild bibasilar opacity which could be due to atelectasis but pneumonia not excluded in the appropriate clinical setting.             Workstation performed: [id number]           Cardiology:  ECG 12 lead   Final Result by Leny Mcclendon MD (11/12 1657)   Normal sinus rhythm with sinus arrhythmia   Left axis deviation   Low voltage QRS   Inferior infarct (cited on or before 12-Nov-2024)   Abnormal ECG   When compared with ECG of 12-Nov-2024 12:52,   No significant change was found   Confirmed by Leny Mcclendon ([id number]) on 11/12/2024 4:57:51 PM      ECG 12 lead   Final Result by Yimi Carvajal MD (11/12 1303)   Sinus tachycardia   Left axis deviation   Low voltage QRS   Inferior infarct , age undetermined   Abnormal ECG   No previous ECGs available   Confirmed by Yimi Carvajal ([id number]) on 11/12/2024 1:03:37 PM        GI:  No orders to display           Results from last 7 days   Lab Units 11/14/24  0501 11/13/24  0655 11/12/24  1140   WBC Thousand/uL 18.60* 28.33* 15.05*   HEMOGLOBIN g/dL 10.6* 11.0* 12.5   HEMATOCRIT % 32.5* 33.3* 38.5   PLATELETS Thousands/uL 237 264 321   BANDS PCT %  --  15* 9*         Results from last 7 days   Lab Units 11/14/24  0501 11/13/24  0655 11/12/24  1140   SODIUM mmol/L 139 141 136   POTASSIUM mmol/L 2.8* 3.4* 3.4*   CHLORIDE mmol/L 107 108 100   CO2 mmol/L 26 26 25   ANION GAP mmol/L 6 7 11   BUN mg/dL 15 18 15   CREATININE mg/dL 0.68 0.87 1.48*   EGFR ml/min/1.73sq m 83 63 33   CALCIUM mg/dL 8.9 8.9 10.1   MAGNESIUM mg/dL  --  1.7*  --      Results from last 7 days   Lab Units 11/14/24  0501 11/13/24  0655 11/12/24  1140   AST U/L 12* 11* 7*   ALT U/L 7 6* 4*   ALK PHOS U/L 106* 104 143*   TOTAL PROTEIN g/dL 5.2* 5.4* 6.5   ALBUMIN g/dL 2.7* 2.8* 3.5   TOTAL BILIRUBIN mg/dL 0.29 0.29 0.75         Results from last 7 days   Lab Units 11/14/24  0501 11/13/24  0655 11/12/24  1140   GLUCOSE RANDOM mg/dL 67 84 100       Results from last 7 days   Lab Units 11/12/24  1337 11/12/24  1140   HS TNI 0HR ng/L  --  9   HS TNI 2HR ng/L <2  --    HSTNI D2 ng/L <-7  --          Results from last 7 days   Lab Units  11/13/24  0655 11/12/24  2325 11/12/24  1140   PROTIME seconds  --   --  16.5*   INR   --   --  1.32*   PTT seconds 183* >210* 28         Results from last 7 days   Lab Units 11/14/24  0501   PROCALCITONIN ng/ml 66.86*     Results from last 7 days   Lab Units 11/12/24  1337 11/12/24  1140   LACTIC ACID mmol/L 0.7 4.8*                                 Results from last 7 days   Lab Units 11/12/24  1140   LIPASE u/L 16                 Results from last 7 days   Lab Units 11/12/24  1244   CLARITY UA  Turbid   COLOR UA  Yellow   SPEC GRAV UA  1.008   PH UA  6.5   GLUCOSE UA mg/dl Negative   KETONES UA mg/dl Negative   BLOOD UA  Trace*   PROTEIN UA mg/dl Trace*   NITRITE UA  Positive*   BILIRUBIN UA  Negative   UROBILINOGEN UA (BE) mg/dl <2.0   LEUKOCYTES UA  Large*   WBC UA /hpf Innumerable*   RBC UA /hpf 4-10*   BACTERIA UA /hpf Moderate*   EPITHELIAL CELLS WET PREP /hpf Occasional                                 Results from last 7 days   Lab Units 11/12/24  1255 11/12/24  1248 11/12/24  1244   BLOOD CULTURE  Proteus mirabilis* Proteus mirabilis*  --    GRAM STAIN RESULT  Gram negative rods* Gram negative rods*  --    URINE CULTURE   --   --  >100,000 cfu/ml Escherichia coli*  Proteus mirabilis*                   Network Utilization Review Department  ATTENTION: Please call with any questions or concerns to [phone] and carefully listen to the prompts so that you are directed to the right person. All voicemails are confidential.   For Discharge needs, contact Care Management DC Support Team at [phone] opt. 2  Send all requests for admission clinical reviews, approved or denied determinations and any other requests to dedicated fax number below belonging to the campus where the patient is receiving treatment. List of dedicated fax numbers for the Facilities:  FACILITY NAME UR FAX NUMBER   ADMISSION DENIALS (Administrative/Medical Necessity) [phone]   DISCHARGE SUPPORT TEAM (NETWORK) [phone]   PARENT  CHILD HEALTH (Maternity/NICU/Pediatrics) [phone]   [facility] [phone]   [facility] [phone]   [facility] [phone]   [facility] [phone]   [facility] [phone]   [facility] [phone]   [facility] [phone]   [facility] [phone]   [facility] [phone]   [facility] [phone]   [facility] [phone]   [facility] [phone]

## 2024-11-14 NOTE — PHYSICAL THERAPY NOTE
PHYSICAL THERAPY NOTE          Patient Name: Tiana De La Cruz  Today's Date: 11/14/2024 11/14/24 2383   Note Type   Note Type Treatment   Pain Assessment   Pain Assessment Tool 0-10   Pain Score No Pain   Restrictions/Precautions   Other Precautions Cognitive;Chair Alarm;Bed Alarm;Fall Risk   General   Chart Reviewed Yes   Family/Caregiver Present No   Cognition   Overall Cognitive Status Impaired   Arousal/Participation Alert;Responsive;Cooperative   Attention Attends with cues to redirect   Orientation Level Oriented to person;Oriented to place  (oriented to day and year NOT month or date.)   Memory Decreased short term memory;Decreased recall of recent events;Decreased recall of precautions   Following Commands Follows one step commands with increased time or repetition   Subjective   Subjective PT agreeable to PT.   Bed Mobility   Rolling R 2  Maximal assistance   Additional items Bedrails;Increased time required;Verbal cues;LE management;Assist x 2   Rolling L 2  Maximal assistance   Additional items Bedrails;Increased time required;Verbal cues;LE management;Assist x 2   Supine to Sit 2  Maximal assistance   Additional items Assist x 2;HOB elevated;Increased time required;Verbal cues;LE management   Sit to Supine 2  Maximal assistance   Additional items Assist x 2;Increased time required;Verbal cues;LE management   Transfers   Sit to Stand 2  Maximal assistance   Additional items Assist x 2;Increased time required;Verbal cues   Stand to Sit 2  Maximal assistance   Additional items Assist x 2;Increased time required;Verbal cues   Additional Comments verbal cues for hand placement,  b/l le positining and placement, cues and assistance for anterior weight shift with sit to stand.  b/l feet blocked due to sliding of feet forward with sit to stand.  heavy retropulsion noted., retropulsion with static standing requiring max assist to  maintain upright standing and standing balance.   Ambulation/Elevation   Gait pattern Improper Weight shift;Poor UE support;Decreased foot clearance;R Knee Elsa;Retropulsion;Narrow URIEL;Short stride;Excessively slow  (pt  unable to take steps with first attempt,  pt  able to take one steps with each le with second attempt.)   Gait Assistance 2  Maximal assist   Additional items Assist x 2;Verbal cues   Assistive Device Rolling walker   Distance 1 short step forward with each   Ambulation/Elevation Additional Comments pt unable to take steps with first attempt, pt able to take one steps with each le with second attempt.   Balance   Static Sitting Good   Dynamic Sitting Fair   Static Standing Poor -  (retropulsion)   Dynamic Standing Poor -   Ambulatory Zero  (retropulsion max assist x2)   Activity Tolerance   Activity Tolerance Patient limited by pain  (fear of falling- cognition)   Medical Staff Made Aware restorative therapist, Yazmin   Exercises   Hip Flexion Sitting;10 reps;AROM;Bilateral   Knee AROM Long Arc Quad Sitting;10 reps;AROM;Bilateral   Ankle Pumps Sitting;10 reps;AROM;Bilateral   Balance training  sitting balance/ tolernace activites EOB x 20 mintues with close supervsion to supervision.  pt  performs seated b/l le arom exercises  x 10 reps.   Assessment   Prognosis Fair   Problem List Decreased strength;Decreased endurance;Impaired balance;Decreased mobility;Decreased cognition;Impaired judgement;Decreased safety awareness;Decreased skin integrity   Assessment Pt seen for PT treatment session this date with interventions consisting of bed mobility, transfer training, gait training, and HEP, and education provided as needed for safety and direction to improve functional mobility, safety awareness, and activity tolerance. Pt agreeable to PT treatment session upon arrival, pt found supine in bed. At end of session, pt left supine in bed with all needs in reach. In comparison to previous session, pt with  improvement in activity tolerance and static sitting balance. Pt  is showing improved sitting tolerance at EOB, sitting tolerance 20 minutes with close supervision to supervision assist x1. Pt  performs seated b/l le arom exercises at EOB x 10 reps.  Pt  demonstrates impairments in balance,standing tolerance, overall strength and mobilty mobility, gait,  gait stability, extreme fear of falling and cognition requiring maximal assistance x2 for rolling L and R, supine<> sit, and sit<> stand transfers.  Pt  requires blocking of b/l feet to prevent then from sliding forward with sit to stand transfers and mod assist x2 for anterior weight shifting,verbal cues for hand placement, b/l le positioning and placement of b/l le's ,heavy retropulsion noted.with sit to stand, retropulsion with static standing requiring max assist to maintain upright standing and standing balance.  pt was unable to take steps with first attempt, pt able to take one step with each le with second attempt, poor weightshifting, ue weightbearing, balance, and foot clearance.     Continue to recommend  level II moderate rehab resource intensity  at time of d/c in order to maximize pt's functional independence and safety w/ mobility. Pt continues to be functioning below baseline level. PT will continue to see pt while here in order to address the deficits listed above and provide interventions consistent w/ POC in effort to achieve STGs.    The patient's AM-PAC Basic Mobility Inpatient Short Form Raw Score is 6. A raw score less than 16 suggests the patient may benefit from discharge to post-acute rehabilitation services. Please also refer to the recommendation of the Physical Therapist for safe discharge planning.   Goals   Patient Goals to go back to [facility]   STG Expiration Date 11/23/24   PT Treatment Day 1   Plan   Treatment/Interventions Functional transfer training;LE strengthening/ROM;Therapeutic exercise;Endurance training;Patient/family  training;Cognitive reorientation;Equipment eval/education;Bed mobility;Gait training;Spoke to nursing  (restorative therapist)   Progress Slow progress, decreased activity tolerance  (cognition, fear of falling)   PT Frequency 2-3x/wk   Discharge Recommendation   Rehab Resource Intensity Level, PT II (Moderate Resource Intensity)   AM-PAC Basic Mobility Inpatient   Turning in Flat Bed Without Bedrails 1   Lying on Back to Sitting on Edge of Flat Bed Without Bedrails 1   Moving Bed to Chair 1   Standing Up From Chair Using Arms 1   Walk in Room 1   Climb 3-5 Stairs With Railing 1   Basic Mobility Inpatient Raw Score 6   Turning Head Towards Sound 4   Follow Simple Instructions 4   Low Function Basic Mobility Raw Score  14   Low Function Basic Mobility Standardized Score  22.01   [facility] Highest Level Of Mobility   -HL Goal 2: Bed activities/Dependent transfer   -HL Achieved 3: Sit at edge of bed   Education   Education Provided Mobility training;Home exercise program;Assistive device   Patient Reinforcement needed   End of Consult   Patient Position at End of Consult Supine;Bed/Chair alarm activated;All needs within reach   [place], PTA

## 2024-11-14 NOTE — PLAN OF CARE
Problem: Potential for Falls  Goal: Patient will remain free of falls  Description: INTERVENTIONS:  - Educate patient/family on patient safety including physical limitations  - Instruct patient to call for assistance with activity   - Consult OT/PT to assist with strengthening/mobility   - Keep Call bell within reach  - Keep bed low and locked with side rails adjusted as appropriate  - Keep care items and personal belongings within reach  - Initiate and maintain comfort rounds  - Make Fall Risk Sign visible to staff  - Offer Toileting every 2 Hours, in advance of need  - Initiate/Maintain bed alarm  - Obtain necessary fall risk management equipment: bed alarm, call bell  - Apply yellow socks and bracelet for high fall risk patients  - Consider moving patient to room near nurses station  Outcome: Progressing     Problem: Prexisting or High Potential for Compromised Skin Integrity  Goal: Skin integrity is maintained or improved  Description: INTERVENTIONS:  - Identify patients at risk for skin breakdown  - Assess and monitor skin integrity  - Assess and monitor nutrition and hydration status  - Monitor labs   - Assess for incontinence   - Turn and reposition patient  - Assist with mobility/ambulation  - Relieve pressure over bony prominences  - Avoid friction and shearing  - Provide appropriate hygiene as needed including keeping skin clean and dry  - Evaluate need for skin moisturizer/barrier cream  - Collaborate with interdisciplinary team   - Patient/family teaching  - Consider wound care consult   Outcome: Progressing     Problem: PAIN - ADULT  Goal: Verbalizes/displays adequate comfort level or baseline comfort level  Description: Interventions:  - Encourage patient to monitor pain and request assistance  - Assess pain using appropriate pain scale  - Administer analgesics based on type and severity of pain and evaluate response  - Implement non-pharmacological measures as appropriate and evaluate response  -  Consider cultural and social influences on pain and pain management  - Notify physician/advanced practitioner if interventions unsuccessful or patient reports new pain  Outcome: Progressing     Problem: INFECTION - ADULT  Goal: Absence or prevention of progression during hospitalization  Description: INTERVENTIONS:  - Assess and monitor for signs and symptoms of infection  - Monitor lab/diagnostic results  - Monitor all insertion sites, i.e. indwelling lines, tubes, and drains  - Monitor endotracheal if appropriate and nasal secretions for changes in amount and color  - [place] appropriate cooling/warming therapies per order  - Administer medications as ordered  - Instruct and encourage patient and family to use good hand hygiene technique  - Identify and instruct in appropriate isolation precautions for identified infection/condition  Outcome: Progressing  Goal: Absence of fever/infection during neutropenic period  Description: INTERVENTIONS:  - Monitor WBC    Outcome: Progressing     Problem: SAFETY ADULT  Goal: Patient will remain free of falls  Description: INTERVENTIONS:  - Educate patient/family on patient safety including physical limitations  - Instruct patient to call for assistance with activity   - Consult OT/PT to assist with strengthening/mobility   - Keep Call bell within reach  - Keep bed low and locked with side rails adjusted as appropriate  - Keep care items and personal belongings within reach  - Initiate and maintain comfort rounds  - Make Fall Risk Sign visible to staff  - Offer Toileting every 2 Hours, in advance of need  - Initiate/Maintain bed alarm  - Obtain necessary fall risk management equipment: bed alarm call bell  - Apply yellow socks and bracelet for high fall risk patients  - Consider moving patient to room near nurses station  Outcome: Progressing  Goal: Maintain or return to baseline ADL function  Description: INTERVENTIONS:  -  Assess patient's ability to carry out ADLs; assess  patient's baseline for ADL function and identify physical deficits which impact ability to perform ADLs (bathing, care of mouth/teeth, toileting, grooming, dressing, etc.)  - Assess/evaluate cause of self-care deficits   - Assess range of motion  - Assess patient's mobility; develop plan if impaired  - Assess patient's need for assistive devices and provide as appropriate  - Encourage maximum independence but intervene and supervise when necessary  - Involve family in performance of ADLs  - Assess for home care needs following discharge   - Consider OT consult to assist with ADL evaluation and planning for discharge  - Provide patient education as appropriate  Outcome: Progressing  Goal: Maintains/Returns to pre admission functional level  Description: INTERVENTIONS:  - Perform AM-PAC 6 Click Basic Mobility/ Daily Activity assessment daily.  - Set and communicate daily mobility goal to care team and patient/family/caregiver.   - Collaborate with rehabilitation services on mobility goals if consulted  - Perform Range of Motion 4 times a day.  - Reposition patient every 2 hours.  - Dangle patient 3 times a day  - Stand patient 3 times a day  - Ambulate patient 3 times a day  - Out of bed to chair 3 times a day   - Out of bed for meals 3 times a day  - Out of bed for toileting  - Record patient progress and toleration of activity level   Outcome: Progressing     Problem: DISCHARGE PLANNING  Goal: Discharge to home or other facility with appropriate resources  Description: INTERVENTIONS:  - Identify barriers to discharge w/patient and caregiver  - Arrange for needed discharge resources and transportation as appropriate  - Identify discharge learning needs (meds, wound care, etc.)  - Arrange for interpretive services to assist at discharge as needed  - Refer to Case Management Department for coordinating discharge planning if the patient needs post-hospital services based on physician/advanced practitioner order or  complex needs related to functional status, cognitive ability, or social support system  Outcome: Progressing     Problem: Knowledge Deficit  Goal: Patient/family/caregiver demonstrates understanding of disease process, treatment plan, medications, and discharge instructions  Description: Complete learning assessment and assess knowledge base.  Interventions:  - Provide teaching at level of understanding  - Provide teaching via preferred learning methods  Outcome: Progressing     Problem: GENITOURINARY - ADULT  Goal: Maintains or returns to baseline urinary function  Description: INTERVENTIONS:  - Assess urinary function  - Encourage oral fluids to ensure adequate hydration if ordered  - Administer IV fluids as ordered to ensure adequate hydration  - Administer ordered medications as needed  - Offer frequent toileting  - Follow urinary retention protocol if ordered  Outcome: Progressing  Goal: Absence of urinary retention  Description: INTERVENTIONS:  - Assess patient’s ability to void and empty bladder  - Monitor I/O  - Bladder scan as needed  - Discuss with physician/AP medications to alleviate retention as needed  - Discuss catheterization for long term situations as appropriate  Outcome: Progressing  Goal: Urinary catheter remains patent  Description: INTERVENTIONS:  - Assess patency of urinary catheter  - If patient has a chronic to, consider changing catheter if non-functioning  - Follow guidelines for intermittent irrigation of non-functioning urinary catheter  Outcome: Progressing     Problem: METABOLIC, FLUID AND ELECTROLYTES - ADULT  Goal: Electrolytes maintained within normal limits  Description: INTERVENTIONS:  - Monitor labs and assess patient for signs and symptoms of electrolyte imbalances  - Administer electrolyte replacement as ordered  - Monitor response to electrolyte replacements, including repeat lab results as appropriate  - Instruct patient on fluid and nutrition as appropriate  Outcome:  Progressing       Problem: HEMATOLOGIC - ADULT  Goal: Maintains hematologic stability  Description: INTERVENTIONS  - Assess for signs and symptoms of bleeding or hemorrhage  - Monitor labs  - Administer supportive blood products/factors as ordered and appropriate  Outcome: Progressing

## 2024-11-14 NOTE — TELEPHONE ENCOUNTER
Tiana is POD #1 s/p right ureteral stent placement by Dr. To.  She has dementia at baseline and further procedures will need to be discussed/consented by her son Dennis De La Cruz.  She will maintain ureteral stent until her second stage stone procedure, case placed.    ** new patient **

## 2024-11-14 NOTE — PLAN OF CARE
Problem: PHYSICAL THERAPY ADULT  Goal: Performs mobility at highest level of function for planned discharge setting.  See evaluation for individualized goals.  Description: Treatment/Interventions: Functional transfer training, LE strengthening/ROM, Therapeutic exercise, Cognitive reorientation, Patient/family training, Equipment eval/education, Bed mobility, Gait training, Continued evaluation, Spoke to nursing, OT          See flowsheet documentation for full assessment, interventions and recommendations.  Outcome: Progressing  Note: Prognosis: Fair  Problem List: Decreased strength, Decreased endurance, Impaired balance, Decreased mobility, Decreased cognition, Impaired judgement, Decreased safety awareness, Decreased skin integrity  Assessment: Pt seen for PT treatment session this date with interventions consisting of bed mobility, transfer training, gait training, and HEP, and education provided as needed for safety and direction to improve functional mobility, safety awareness, and activity tolerance. Pt agreeable to PT treatment session upon arrival, pt found supine in bed. At end of session, pt left supine in bed with all needs in reach. In comparison to previous session, pt with improvement in activity tolerance and static sitting balance. Pt  is showing improved sitting tolerance at EOB, sitting tolerance 20 minutes with close supervision to supervision assist x1. Pt  performs seated b/l le arom exercises at EOB x 10 reps.  Pt  demonstrates impairments in balance,standing tolerance, overall strength and mobilty mobility, gait,  gait stability, extreme fear of falling and cognition requiring maximal assistance x2 for rolling L and R, supine<> sit, and sit<> stand transfers.  Pt  requires blocking of b/l feet to prevent then from sliding forward with sit to stand transfers and mod assist x2 for anterior weight shifting,verbal cues for hand placement, b/l le positioning and placement of b/l le's ,heavy  retropulsion noted.with sit to stand, retropulsion with static standing requiring max assist to maintain upright standing and standing balance.  pt was unable to take steps with first attempt, pt able to take one step with each le with second attempt, poor weightshifting, ue weightbearing, balance, and foot clearance.     Continue to recommend  level II moderate rehab resource intensity  at time of d/c in order to maximize pt's functional independence and safety w/ mobility. Pt continues to be functioning below baseline level. PT will continue to see pt while here in order to address the deficits listed above and provide interventions consistent w/ POC in effort to achieve STGs.    The patient's AM-PAC Basic Mobility Inpatient Short Form Raw Score is 6. A raw score less than 16 suggests the patient may benefit from discharge to post-acute rehabilitation services. Please also refer to the recommendation of the Physical Therapist for safe discharge planning.  Barriers to Discharge: None     Rehab Resource Intensity Level, PT: II (Moderate Resource Intensity)    See flowsheet documentation for full assessment.

## 2024-11-15 ENCOUNTER — RESULTS FOLLOW-UP (OUTPATIENT)
Dept: EMERGENCY DEPT | Facility: HOSPITAL | Age: 79
End: 2024-11-15

## 2024-11-15 VITALS
OXYGEN SATURATION: 95 % | WEIGHT: 159.61 LBS | HEIGHT: 67 IN | RESPIRATION RATE: 14 BRPM | BODY MASS INDEX: 25.05 KG/M2 | TEMPERATURE: 98.7 F | SYSTOLIC BLOOD PRESSURE: 143 MMHG | DIASTOLIC BLOOD PRESSURE: 69 MMHG | HEART RATE: 75 BPM

## 2024-11-15 LAB
ALBUMIN SERPL BCG-MCNC: 2.7 G/DL (ref 3.5–5)
ALP SERPL-CCNC: 97 U/L (ref 34–104)
ALT SERPL W P-5'-P-CCNC: 7 U/L (ref 7–52)
ANION GAP SERPL CALCULATED.3IONS-SCNC: 5 MMOL/L (ref 4–13)
AST SERPL W P-5'-P-CCNC: 10 U/L (ref 13–39)
BACTERIA BLD CULT: ABNORMAL
BACTERIA BLD CULT: ABNORMAL
BASOPHILS # BLD AUTO: 0.07 THOUSANDS/ÂΜL (ref 0–0.1)
BASOPHILS NFR BLD AUTO: 1 % (ref 0–1)
BILIRUB SERPL-MCNC: 0.26 MG/DL (ref 0.2–1)
BUN SERPL-MCNC: 14 MG/DL (ref 5–25)
CALCIUM ALBUM COR SERPL-MCNC: 10.1 MG/DL (ref 8.3–10.1)
CALCIUM SERPL-MCNC: 9.1 MG/DL (ref 8.4–10.2)
CHLORIDE SERPL-SCNC: 109 MMOL/L (ref 96–108)
CO2 SERPL-SCNC: 26 MMOL/L (ref 21–32)
CREAT SERPL-MCNC: 0.65 MG/DL (ref 0.6–1.3)
EOSINOPHIL # BLD AUTO: 0.45 THOUSAND/ÂΜL (ref 0–0.61)
EOSINOPHIL NFR BLD AUTO: 4 % (ref 0–6)
ERYTHROCYTE [DISTWIDTH] IN BLOOD BY AUTOMATED COUNT: 14.3 % (ref 11.6–15.1)
GFR SERPL CREATININE-BSD FRML MDRD: 84 ML/MIN/1.73SQ M
GLUCOSE SERPL-MCNC: 56 MG/DL (ref 65–140)
GRAM STN SPEC: ABNORMAL
GRAM STN SPEC: ABNORMAL
HCT VFR BLD AUTO: 32.2 % (ref 34.8–46.1)
HGB BLD-MCNC: 10.8 G/DL (ref 11.5–15.4)
IMM GRANULOCYTES # BLD AUTO: 0.07 THOUSAND/UL (ref 0–0.2)
IMM GRANULOCYTES NFR BLD AUTO: 1 % (ref 0–2)
LYMPHOCYTES # BLD AUTO: 0.96 THOUSANDS/ÂΜL (ref 0.6–4.47)
LYMPHOCYTES NFR BLD AUTO: 9 % (ref 14–44)
MAGNESIUM SERPL-MCNC: 1.9 MG/DL (ref 1.9–2.7)
MCH RBC QN AUTO: 29.4 PG (ref 26.8–34.3)
MCHC RBC AUTO-ENTMCNC: 33.5 G/DL (ref 31.4–37.4)
MCV RBC AUTO: 88 FL (ref 82–98)
MONOCYTES # BLD AUTO: 0.55 THOUSAND/ÂΜL (ref 0.17–1.22)
MONOCYTES NFR BLD AUTO: 5 % (ref 4–12)
NEUTROPHILS # BLD AUTO: 8.84 THOUSANDS/ÂΜL (ref 1.85–7.62)
NEUTS SEG NFR BLD AUTO: 80 % (ref 43–75)
NRBC BLD AUTO-RTO: 0 /100 WBCS
PHOSPHATE SERPL-MCNC: 1.8 MG/DL (ref 2.3–4.1)
PLATELET # BLD AUTO: 242 THOUSANDS/UL (ref 149–390)
PMV BLD AUTO: 10.4 FL (ref 8.9–12.7)
POTASSIUM SERPL-SCNC: 3.7 MMOL/L (ref 3.5–5.3)
PROCALCITONIN SERPL-MCNC: 38.25 NG/ML
PROT SERPL-MCNC: 5.3 G/DL (ref 6.4–8.4)
PROTEUS SP DNA BLD POS QL NAA+NON-PROBE: DETECTED
RBC # BLD AUTO: 3.67 MILLION/UL (ref 3.81–5.12)
SODIUM SERPL-SCNC: 140 MMOL/L (ref 135–147)
WBC # BLD AUTO: 10.94 THOUSAND/UL (ref 4.31–10.16)

## 2024-11-15 PROCEDURE — 99239 HOSP IP/OBS DSCHRG MGMT >30: CPT | Performed by: INTERNAL MEDICINE

## 2024-11-15 PROCEDURE — 83735 ASSAY OF MAGNESIUM: CPT

## 2024-11-15 PROCEDURE — 84145 PROCALCITONIN (PCT): CPT

## 2024-11-15 PROCEDURE — 85025 COMPLETE CBC W/AUTO DIFF WBC: CPT

## 2024-11-15 PROCEDURE — 80053 COMPREHEN METABOLIC PANEL: CPT

## 2024-11-15 PROCEDURE — 84100 ASSAY OF PHOSPHORUS: CPT

## 2024-11-15 RX ORDER — CEPHALEXIN 500 MG/1
500 CAPSULE ORAL EVERY 6 HOURS SCHEDULED
Qty: 28 CAPSULE | Refills: 0 | Status: SHIPPED | OUTPATIENT
Start: 2024-11-15 | End: 2024-11-22

## 2024-11-15 RX ADMIN — LEVOTHYROXINE SODIUM 75 MCG: 75 TABLET ORAL at 05:06

## 2024-11-15 RX ADMIN — AMLODIPINE BESYLATE 10 MG: 10 TABLET ORAL at 08:36

## 2024-11-15 RX ADMIN — ACETAMINOPHEN 325MG 975 MG: 325 TABLET ORAL at 05:15

## 2024-11-15 RX ADMIN — APIXABAN 10 MG: 5 TABLET, FILM COATED ORAL at 08:36

## 2024-11-15 RX ADMIN — CEFTRIAXONE 1000 MG: 1 INJECTION, POWDER, FOR SOLUTION INTRAMUSCULAR; INTRAVENOUS at 00:49

## 2024-11-15 RX ADMIN — VENLAFAXINE HYDROCHLORIDE 75 MG: 75 CAPSULE, EXTENDED RELEASE ORAL at 08:36

## 2024-11-15 RX ADMIN — OXCARBAZEPINE 150 MG: 150 TABLET, FILM COATED ORAL at 08:36

## 2024-11-15 NOTE — ASSESSMENT & PLAN NOTE
Bilateral DVT on heparin infusion likely related to malignancy  Started on eliquis  Advised increase mobility.

## 2024-11-15 NOTE — DISCHARGE INSTR - AVS FIRST PAGE
Proteus bacteremia. Continue antibiotics with cephalexin for 7 more days to complete 10 day course.  Stone. Please follow up with urology within 7-10 days  DVT. Eliquis.

## 2024-11-15 NOTE — PLAN OF CARE
Problem: Potential for Falls  Goal: Patient will remain free of falls  Description: INTERVENTIONS:  - Educate patient/family on patient safety including physical limitations  - Instruct patient to call for assistance with activity   - Consult OT/PT to assist with strengthening/mobility   - Keep Call bell within reach  - Keep bed low and locked with side rails adjusted as appropriate  - Keep care items and personal belongings within reach  - Initiate and maintain comfort rounds  - Make Fall Risk Sign visible to staff  - Offer Toileting every 2 Hours, in advance of need  - Initiate/Maintain alarm  - Obtain necessary fall risk management equipment:   - Apply yellow socks and bracelet for high fall risk patients  - Consider moving patient to room near nurses station  Outcome: Progressing     Problem: Prexisting or High Potential for Compromised Skin Integrity  Goal: Skin integrity is maintained or improved  Description: INTERVENTIONS:  - Identify patients at risk for skin breakdown  - Assess and monitor skin integrity  - Assess and monitor nutrition and hydration status  - Monitor labs   - Assess for incontinence   - Turn and reposition patient  - Assist with mobility/ambulation  - Relieve pressure over bony prominences  - Avoid friction and shearing  - Provide appropriate hygiene as needed including keeping skin clean and dry  - Evaluate need for skin moisturizer/barrier cream  - Collaborate with interdisciplinary team   - Patient/family teaching  - Consider wound care consult   Outcome: Progressing     Problem: PAIN - ADULT  Goal: Verbalizes/displays adequate comfort level or baseline comfort level  Description: Interventions:  - Encourage patient to monitor pain and request assistance  - Assess pain using appropriate pain scale  - Administer analgesics based on type and severity of pain and evaluate response  - Implement non-pharmacological measures as appropriate and evaluate response  - Consider cultural and  social influences on pain and pain management  - Notify physician/advanced practitioner if interventions unsuccessful or patient reports new pain  Outcome: Progressing     Problem: INFECTION - ADULT  Goal: Absence or prevention of progression during hospitalization  Description: INTERVENTIONS:  - Assess and monitor for signs and symptoms of infection  - Monitor lab/diagnostic results  - Monitor all insertion sites, i.e. indwelling lines, tubes, and drains  - Monitor endotracheal if appropriate and nasal secretions for changes in amount and color  - [place] appropriate cooling/warming therapies per order  - Administer medications as ordered  - Instruct and encourage patient and family to use good hand hygiene technique  - Identify and instruct in appropriate isolation precautions for identified infection/condition  Outcome: Progressing  Goal: Absence of fever/infection during neutropenic period  Description: INTERVENTIONS:  - Monitor WBC    Outcome: Progressing     Problem: SAFETY ADULT  Goal: Patient will remain free of falls  Description: INTERVENTIONS:  - Educate patient/family on patient safety including physical limitations  - Instruct patient to call for assistance with activity   - Consult OT/PT to assist with strengthening/mobility   - Keep Call bell within reach  - Keep bed low and locked with side rails adjusted as appropriate  - Keep care items and personal belongings within reach  - Initiate and maintain comfort rounds  - Make Fall Risk Sign visible to staff  - Offer Toileting every 2 Hours, in advance of need  - Initiate/Maintain alarm  - Obtain necessary fall risk management equipment:   - Apply yellow socks and bracelet for high fall risk patients  - Consider moving patient to room near nurses station  Outcome: Progressing  Goal: Maintain or return to baseline ADL function  Description: INTERVENTIONS:  -  Assess patient's ability to carry out ADLs; assess patient's baseline for ADL function and  identify physical deficits which impact ability to perform ADLs (bathing, care of mouth/teeth, toileting, grooming, dressing, etc.)  - Assess/evaluate cause of self-care deficits   - Assess range of motion  - Assess patient's mobility; develop plan if impaired  - Assess patient's need for assistive devices and provide as appropriate  - Encourage maximum independence but intervene and supervise when necessary  - Involve family in performance of ADLs  - Assess for home care needs following discharge   - Consider OT consult to assist with ADL evaluation and planning for discharge  - Provide patient education as appropriate  Outcome: Progressing  Goal: Maintains/Returns to pre admission functional level  Description: INTERVENTIONS:  - Perform AM-PAC 6 Click Basic Mobility/ Daily Activity assessment daily.  - Set and communicate daily mobility goal to care team and patient/family/caregiver.   - Collaborate with rehabilitation services on mobility goals if consulted  - Perform Range of Motion 3 times a day.  - Reposition patient every 2 hours.  - Dangle patient 3 times a day  - Stand patient 3 times a day  - Ambulate patient 3 times a day  - Out of bed to chair 3 times a day   - Out of bed for meals 3 times a day  - Out of bed for toileting  - Record patient progress and toleration of activity level   Outcome: Progressing     Problem: DISCHARGE PLANNING  Goal: Discharge to home or other facility with appropriate resources  Description: INTERVENTIONS:  - Identify barriers to discharge w/patient and caregiver  - Arrange for needed discharge resources and transportation as appropriate  - Identify discharge learning needs (meds, wound care, etc.)  - Arrange for interpretive services to assist at discharge as needed  - Refer to Case Management Department for coordinating discharge planning if the patient needs post-hospital services based on physician/advanced practitioner order or complex needs related to functional status,  cognitive ability, or social support system  Outcome: Progressing     Problem: Knowledge Deficit  Goal: Patient/family/caregiver demonstrates understanding of disease process, treatment plan, medications, and discharge instructions  Description: Complete learning assessment and assess knowledge base.  Interventions:  - Provide teaching at level of understanding  - Provide teaching via preferred learning methods  Outcome: Progressing     Problem: GENITOURINARY - ADULT  Goal: Maintains or returns to baseline urinary function  Description: INTERVENTIONS:  - Assess urinary function  - Encourage oral fluids to ensure adequate hydration if ordered  - Administer IV fluids as ordered to ensure adequate hydration  - Administer ordered medications as needed  - Offer frequent toileting  - Follow urinary retention protocol if ordered  Outcome: Progressing  Goal: Absence of urinary retention  Description: INTERVENTIONS:  - Assess patient’s ability to void and empty bladder  - Monitor I/O  - Bladder scan as needed  - Discuss with physician/AP medications to alleviate retention as needed  - Discuss catheterization for long term situations as appropriate  Outcome: Progressing  Goal: Urinary catheter remains patent  Description: INTERVENTIONS:  - Assess patency of urinary catheter  - If patient has a chronic to, consider changing catheter if non-functioning  - Follow guidelines for intermittent irrigation of non-functioning urinary catheter  Outcome: Progressing     Problem: METABOLIC, FLUID AND ELECTROLYTES - ADULT  Goal: Electrolytes maintained within normal limits  Description: INTERVENTIONS:  - Monitor labs and assess patient for signs and symptoms of electrolyte imbalances  - Administer electrolyte replacement as ordered  - Monitor response to electrolyte replacements, including repeat lab results as appropriate  - Instruct patient on fluid and nutrition as appropriate  Outcome: Progressing     Problem: SKIN/TISSUE INTEGRITY  - ADULT  Goal: Incision(s), wounds(s) or drain site(s) healing without S/S of infection  Description: INTERVENTIONS  - Assess and document dressing, incision, wound bed, drain sites and surrounding tissue  - Provide patient and family education  - Perform skin care/dressing changes   Outcome: Progressing  Goal: Pressure injury heals and does not worsen  Description: Interventions:  - Implement low air loss mattress or specialty surface (Criteria met)  - Apply silicone foam dressing  - Instruct/assist with weight shifting   - Limit chair time  - Use special pressure reducing interventions  - Apply fecal or urinary incontinence containment device   - Perform passive or active ROM   - Turn and reposition patient & offload bony prominences  - Utilize friction reducing device or surface for transfers   - Consider consults to  interdisciplinary teams  - Use incontinent care products after each incontinent episode  - Consider nutrition services referral as needed  Outcome: Progressing     Problem: HEMATOLOGIC - ADULT  Goal: Maintains hematologic stability  Description: INTERVENTIONS  - Assess for signs and symptoms of bleeding or hemorrhage  - Monitor labs  - Administer supportive blood products/factors as ordered and appropriate  Outcome: Progressing

## 2024-11-15 NOTE — ASSESSMENT & PLAN NOTE
History of hypothyroidism dementia and seizure disorder who presents to the hospital with fever  Sepsis present admission secondary to Proteus bacteremia urinary source  Underwent cystoscopy/stent placement  2/2 blood cultures proteus also consistent on urine culture.  During hospitalization has been on ceftriaxone day 3  Discharge: Cephalexin for 7 more days complete a 10-day course of antibiotics for bacteremia.  Urology to schedule definitive treatment/management of ureteral calculus and stent

## 2024-11-15 NOTE — DISCHARGE SUMMARY
Discharge Summary - Hospitalist   Name: Tiana De La Cruz 78 y.o. female I MRN: [id number]  Unit/Bed#: [place] [address] -01 I Date of Admission: 11/12/2024   Date of Service: 11/15/2024 I Hospital Day: 3    Assessment & Plan  Hydronephrosis with ureteral calculus  History of hypothyroidism dementia and seizure disorder who presents to the hospital with fever  Sepsis present admission secondary to Proteus bacteremia urinary source  Underwent cystoscopy/stent placement  2/2 blood cultures proteus also consistent on urine culture.  During hospitalization has been on ceftriaxone day 3  Discharge: Cephalexin for 7 more days complete a 10-day course of antibiotics for bacteremia.  Urology to schedule definitive treatment/management of ureteral calculus and stent  Hypokalemia  Has been replaced prior to discharge  Continue chronic daily dosing    Results from last 7 days   Lab Units 11/15/24  0500 11/14/24  0501 11/13/24  0655 11/12/24  1140   POTASSIUM mmol/L 3.7 2.8* 3.4* 3.4*     DVT (deep venous thrombosis) (HCC)  Bilateral DVT on heparin infusion likely related to malignancy  Started on eliquis  Advised increase mobility.  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  Basal cell carcinoma of vulva diagnosed in 2023  Per discussion with patient and son, patient declined further investigation/intervention of this      Anxiety  Mood stable continue venlafaxine  Hypothyroid  Continue levothyroxine  Essential (primary) hypertension  Continue amlodipine      Medical Problems       Resolved Problems  Date Reviewed: 11/14/2024   None        Discharging Physician / Practitioner: Soto Da Silva DO  PCP: Erica Lomeli DO  Admission Date:   Admission Orders (From admission, onward)       Ordered        11/12/24 1556  INPATIENT ADMISSION  Once                        Discharge Date: 11/15/24    Consultations During Hospital Stay:  IP CONSULT TO UROLOGY  IP CONSULT TO NUTRITION SERVICES     Procedures  Performed:   Procedure(s) (LRB):  CYSTOSCOPY RETROGRADE PYELOGRAM WITH INSERTION STENT URETERAL (Right)     Images:   CT chest abdomen pelvis w contrast  Result Date: 11/12/2024  Impression: 1. 7 mm right proximal ureteral calculus with moderate hydronephrosis and hydroureter. There is associated urothelial thickening and enhancement suggesting associated ureteritis 2. Left iliac and right common femoral vein DVT suspected. Recommend further characterization with DVT ultrasound. 3. Left peroneal defect may correspond to the wound. No fluid collection seen deep to this wound. I personally discussed this study with GRAY CRESPO on 11/12/2024 3:35 PM. Workstation performed: [id number]     XR chest 1 view portable  Result Date: 11/12/2024  Impression: Mild bibasilar opacity which could be due to atelectasis but pneumonia not excluded in the appropriate clinical setting. Workstation performed: [id number]       Lab Results: I have reviewed the following results:  Results from last 7 days   Lab Units 11/15/24  0500 11/14/24  0501 11/13/24 0655 11/12/24  1140   WBC Thousand/uL 10.94* 18.60* 28.33* 15.05*   HEMOGLOBIN g/dL 10.8* 10.6* 11.0* 12.5   HEMATOCRIT % 32.2* 32.5* 33.3* 38.5   MCV fL 88 88 88 87   TOTAL NEUT ABS Thousand/uL  --   --  24.93* 14.60*   BANDS PCT %  --   --  15* 9*   PLATELETS Thousands/uL 242 237 264 321   INR   --   --   --  1.32*     Results from last 7 days   Lab Units 11/15/24  0500 11/14/24  0501 11/13/24  0655 11/12/24  1140   SODIUM mmol/L 140 139 141 136   POTASSIUM mmol/L 3.7 2.8* 3.4* 3.4*   CHLORIDE mmol/L 109* 107 108 100   CO2 mmol/L 26 26 26 25   BUN mg/dL 14 15 18 15   CREATININE mg/dL 0.65 0.68 0.87 1.48*   CALCIUM mg/dL 9.1 8.9 8.9 10.1   ALBUMIN g/dL 2.7* 2.7* 2.8* 3.5   TOTAL BILIRUBIN mg/dL 0.26 0.29 0.29 0.75   ALK PHOS U/L 97 106* 104 143*   ALT U/L 7 7 6* 4*   AST U/L 10* 12* 11* 7*   EGFR ml/min/1.73sq m 84 83 63 33   GLUCOSE RANDOM mg/dL 56* 67 84 100         Results from last 7  days   Lab Units 11/12/24  1337 11/12/24  1140   HS TNI 0HR ng/L  --  9   HS TNI 2HR ng/L <2  --                 Results from last 7 days   Lab Units 11/15/24  0500 11/14/24  0501 11/12/24  1337 11/12/24  1140   LACTIC ACID mmol/L  --   --  0.7 4.8*   PROCALCITONIN ng/ml 38.25*   < >  --   --     < > = values in this interval not displayed.           Results from last 7 days   Lab Units 11/12/24  1244   COLOR UA  Yellow   CLARITY UA  Turbid   SPEC GRAV UA  1.008   PH UA  6.5   LEUKOCYTES UA  Large*   NITRITE UA  Positive*   GLUCOSE UA mg/dl Negative   KETONES UA mg/dl Negative   BLOOD UA  Trace*      Results from last 7 days   Lab Units 11/12/24  1244   RBC UA /hpf 4-10*   WBC UA /hpf Innumerable*   EPITHELIAL CELLS WET PREP /hpf Occasional   BACTERIA UA /hpf Moderate*      Results from last 7 days   Lab Units 11/12/24  1255 11/12/24  1248 11/12/24  1244   BLOOD CULTURE  Proteus mirabilis* Proteus mirabilis*  --    GRAM STAIN RESULT  Gram negative rods* Gram negative rods*  --    URINE CULTURE   --   --  >100,000 cfu/ml Escherichia coli*  Proteus mirabilis*           Incidental Findings:      Test Results Pending at Discharge (will require follow up):   Pending Labs       Order Current Status    Blood culture #2 Preliminary result           Reason for Admission:   Fever (Brought in via ems from [facility]. Per EMS facility stated patient had fever this morning and they administered Tylenol. Patient with hx of dementia oriented to self. Wound on left inner buttocks noted. )    Hospital Course:   Tiana De La Cruz is a 78 y.o. female patient who originally presented to the hospital on 11/12/2024 due to febrile illness.  She does have a wound on her perineum which is consistent with previous known malignancy.  3 hospitalization she was found to have ureteral calculi with hydronephrosis.  She did have urethritis with Proteus/E. coli UTI.  She also had Proteus bacteremia.  She remains afebrile and doing well.  She will be  "discharged with antibiotics as outlined above.  She will need definitive management of her ureteral calculus which will be coordinated with urology as an outpatient.    Please see above list of diagnoses and related plan for additional information.     Condition at Discharge: stable     Discharge Day Visit / Exam:   Subjective: Seen and examined, wants to go home.    Vitals: Blood Pressure: 155/76 (11/15/24 0715)  Pulse: 83 (11/15/24 0715)  Temperature: 97.8 °F (36.6 °C) (11/15/24 0715)  Temp Source: Axillary (11/15/24 0715)  Respirations: 14 (11/15/24 0715)  Height: 5' 7\" (170.2 cm) (11/12/24 2254)  Weight - Scale: 72.4 kg (159 lb 9.8 oz) (11/12/24 2254)  SpO2: 94 % (11/15/24 0715)    Exam:   Physical Exam  Vitals reviewed.   Constitutional:       General: She is not in acute distress.     Appearance: Normal appearance.   HENT:      Head: Atraumatic.   Eyes:      General: No scleral icterus.  Cardiovascular:      Rate and Rhythm: Regular rhythm.   Pulmonary:      Breath sounds: Decreased breath sounds present. No wheezing.   Abdominal:      General: Bowel sounds are normal.      Palpations: Abdomen is soft.      Tenderness: There is no abdominal tenderness. There is no guarding.   Musculoskeletal:         General: No swelling.   Skin:     General: Skin is warm.   Neurological:      General: No focal deficit present.      Mental Status: She is alert. Mental status is at baseline.      Motor: No weakness.   Psychiatric:         Mood and Affect: Mood normal.       Discussion with Family: Son on telephone    Discharge instructions/Information to patient and family:   See after visit summary for information provided to patient and family.      Provisions for Follow-Up Care:  See after visit summary for information related to follow-up care and any pertinent home health orders.      Mobility at time of Discharge:  Basic Mobility Inpatient Raw Score: 6  -HLM Goal: 2: Bed activities/Dependent transfer  -HLM Achieved: " 2: Bed activities/Dependent transfer  -[facility] Goal achieved. Continue to encourage appropriate mobility.    Disposition:   Other: Previous SNF/[facility]    Planned Readmission: No     Discharge Medications:  See after visit summary for reconciled discharge medications provided to patient and family.      Administrative Statements   I spent 35 minutes discharging the patient. This time was spent on the day of discharge. I had direct contact with the patient on the day of discharge. Greater than 50% of the total time was spent examining patient, answering all patient questions, arranging and discussing plan of care with patient as well as directly providing post-discharge instructions.  Additional time then spent on discharge activities.    **Please Note: This note may have been constructed using a voice recognition system**

## 2024-11-15 NOTE — CASE MANAGEMENT
[facility] Support [facility] received request for authorization from Care Manager.  Authorization request submitted for: SNF  [facility]  NPI:[id number]   Facility MD:  Dr Anton Akhtar   NPI: [id number]  Authorization initiated by contacting insurance:  MELIDA  Via: H&CC Portal   Clinicals submitted via Portal attachment   Pending Reference #:[id number]     Care Manager notified: Alina Osborne      Updates to authorization status will be noted in chart. Please reach out to CM for updates on any clinical information.

## 2024-11-15 NOTE — CASE MANAGEMENT
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 2024     Anxiety      Hypothyroid      Fibromyalgia      Right hip pain 2021     Back pain 2021     IBS (irritable bowel syndrome) 2021     Abnormal CT of liver 2021       LOS (days): 3  Geometric Mean LOS (GMLOS) (days): 3.5  Days to GMLOS:0.6     OBJECTIVE:  Risk of Unplanned Readmission Score: 11.95         Current admission status: Inpatient   Preferred Pharmacy:   [facility] - [place], [place] - [address] [place] [address]  [address] [place] [address]  [address] [place] [address]  Phone: [phone] Fax: [phone]    [id number] - [place], [place] - [address] [address]  Phone: [phone] Fax: [phone]    Primary Care Provider: Erica Lomeli DO    Primary Insurance: AARP MC REP  Secondary Insurance:     DISCHARGE DETAILS:    Discharge planning discussed with:: Niki sexton  Freedom of Choice: Yes  Comments - Freedom of Choice: pt LTC resident with request made by liaison to send for authorization- request to CM DC support to initiate auth (do NOT need to hold dc waiting for auth due to pt being LTC)  CM contacted family/caregiver?: Yes  Were Treatment Team discharge recommendations reviewed with patient/caregiver?: Yes  Did patient/caregiver verbalize understanding of patient care  needs?: Yes       Contacts  Patient Contacts: Dennis De La Cruz- son  Relationship to Patient:: Family  Contact Method: Phone  Phone Number: [phone]  Reason/Outcome: Discharge Planning                        Treatment Team Recommendation: Facility Return, SNF  Discharge Destination Plan:: SNF, Facility Return ([facility])  Transport at Discharge : Stretcher van     Number/Name of Dispatcher: Round trip  Transported by (Company and Unit #): [employer]  ETA of Transport (Date): 11/15/24  ETA of Transport (Time): 1300              IMM Given (Date):: 11/15/24  IMM Given to:: Family (Son Dennis agreeable to return to [facility] this day)          Accepting Facility Name, City & State : [facility]  Receiving Facility/Agency Phone Number: [phone]  Facility/Agency Fax Number: [phone]

## 2024-11-15 NOTE — ASSESSMENT & PLAN NOTE
Has been replaced prior to discharge  Continue chronic daily dosing    Results from last 7 days   Lab Units 11/15/24  0500 11/14/24  0501 11/13/24  0655 11/12/24  1140   POTASSIUM mmol/L 3.7 2.8* 3.4* 3.4*

## 2024-11-16 NOTE — CASE MANAGEMENT
Case Management Progress Note    Patient name Tiana De La Cruz  Location [place] 2 /South 2 M* MRN [id number]  : 1945 Date 2024       LOS (days): 3  Geometric Mean LOS (GMLOS) (days): 3.5  Days to GMLOS:0.6        OBJECTIVE:        Current admission status: Inpatient  Preferred Pharmacy:   [facility] - [place], [place] - [address]  [address]  [address]  [facility] [address]  Phone: [phone] Fax: [phone]    [id number] - [place] - [address]  [address] [facility] [address]  Phone: [phone] Fax: [phone]    Primary Care Provider: Erica Lomeli DO    Primary Insurance: AARVAHE TELLO REP  Secondary Insurance:     PROGRESS NOTE:      CM provided with an update by Darren Castillo with [facility] that auth is still pending. Patient was discharged yesterday to [facility] so CM provided this auth still pending update to [facility] via [employer].

## 2024-11-17 NOTE — CASE MANAGEMENT
Payal with [employer] called and stated that auth will be sent to the medical director for further review. If there are any d/c barriers, please call P: [phone]. Reason for medical director review: Pt is close to her baseline of function and appears to have max assist of two people with transfers and stability with use of rolling walker-they don't know if she walked before she came. CM can reach out to them to discuss prior level of functioning if deemed necessary to this case.     CM notified: Xochitl Schuler    UPDATE 12:08 pm  CM replied that pt was d/c on Friday 11/15.

## 2024-11-18 NOTE — CASE MANAGEMENT
Support Center has received PEER TO PEER request prior to determination being made.   Received for SNF Authorization.   Insurance: AARP  Information obtained via Insurance Rep: Shirley   [facility]   Pending Auth #:[id number]   Peer to Peer Phone#:  [phone]     Deadline:11/18 4:00  CM to task P2P to Attending to complete if desired.    Please notify Discharge Support if P2P will not be completed.     Care Manager notified:Alina Osborne     Please reach out to CM for updates on any clinical information.

## 2024-11-18 NOTE — TELEPHONE ENCOUNTER
Scott with [facility] calling in regards to scheduling I reviewed documentation and relayed pt in need of 2nd stage procedure,pt is on their long term unit she is unsure for how long.

## 2024-11-18 NOTE — UTILIZATION REVIEW
NOTIFICATION OF ADMISSION DISCHARGE   This is a Notification of Discharge from [facility]. Please be advised that this patient has been discharge from our facility. Below you will find the admission and discharge date and time including the patient’s disposition.   UTILIZATION REVIEW CONTACT:  Bev Barajas  Utilization   Network Utilization Review Department  Phone: [phone] x carefully listen to the prompts. All voicemails are confidential.  Email: [email or web address]     ADMISSION INFORMATION  PRESENTATION DATE: 11/12/2024 11:08 AM  OBERVATION ADMISSION DATE: N/A  INPATIENT ADMISSION DATE: 11/12/24  3:56 PM   DISCHARGE DATE: 11/15/2024  1:26 PM   DISPOSITION:Discharged/Transferred to Long Term Care/Personal Care Home/Assisted Living    Network Utilization Review Department  ATTENTION: Please call with any questions or concerns to [phone] and carefully listen to the prompts so that you are directed to the right person. All voicemails are confidential.   For Discharge needs, contact Care Management DC Support Team at [phone] opt. 2  Send all requests for admission clinical reviews, approved or denied determinations and any other requests to dedicated fax number below belonging to the campus where the patient is receiving treatment. List of dedicated fax numbers for the Facilities:  FACILITY NAME UR FAX NUMBER   ADMISSION DENIALS (Administrative/Medical Necessity) [phone]   DISCHARGE SUPPORT TEAM ([facility]) [phone]   PARENT CHILD HEALTH (Maternity/NICU/Pediatrics) [phone]   [facility] [phone]   [facility] [phone]   [facility] [phone]   [facility] [phone]   [facility] [phone]   [facility] [phone]   [facility] [phone]    [facility] [phone]   [facility] [phone]   Replaced by [facility] [phone]   [facility] [phone]   [facility] [phone]

## 2024-11-19 NOTE — CASE MANAGEMENT
Support Center has received DENIAL for SNF Authorization.   Insurance: AARP  Denial obtained via Insurance Rep: Veronica  Denial Reason: doesn'r meet the CM's guidelines   [facility]   Denial #: [id number]   Appeal P# [phone]   /   Appeal F# [phone]    Please notify Discharge Support if P2P will not be completed.     Care Manager notified:Alina Delgado Callmelissa     Please reach out to CM for updates on any clinical information.

## 2024-11-19 NOTE — TELEPHONE ENCOUNTER
Spoke with Scott at [facility]  and confirmed surgery date of: 12/12/2024 (declined sooner)  Type of surgery: Right #5  Operating physician: Dr. King  Location of surgery: [facility]    Verbally went over prep with Scott on: 11/19/2024  NPO  Bowel prep? no  Hospital calls afternoon prior with arrival time -Calls Friday afternoon for Monday surgeries  Patient needs ride to and from surgery (outpatient/inpatient)   Pre-op testing to be done 2 weeks prior to surgery   Urine C&S  Blood thinners:   Eliquis  Clearances needed: none    Faxed packet to ([phone] on: 11/19/2024  Copy of packet scanned into Media on: 11/19/2024  Labs in packet  Soap instructions in packet  Pre-op & Post-op in packet  H&P on admit       Consent: on admit    Unit Phone # [phone] provided to Pre Admission    Medication Suspension of: Eliquis  Ordering provider: Soto Da Silva DO  Faxed Medication Suspension form on:  Best fax number: [phone]

## 2024-11-19 NOTE — TELEPHONE ENCOUNTER
Germain from [facility] called to verify the location of the procedure.  Informed her [facility].  She verbalized understanding.

## 2024-12-27 ENCOUNTER — HOME CARE VISIT (OUTPATIENT)
Dept: HOME HEALTH SERVICES | Facility: HOME HEALTHCARE | Age: 79
End: 2024-12-27

## 2025-06-06 NOTE — WOUND OSTOMY CARE
Consult Note - Wound   Tiana De La Cruz 78 y.o. female MRN: [id number]  Unit/Bed#: OR [place] Encounter: [id number]      History and Present Illness:  78 year old female presented to the hospital with fever from facility secondary to hydronephrosis with ureteral calculus.  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  Patient's history significant for vulvar basal cell carcinoma wound (patient has declined surgical intervention), seizures, HTN, hypothyroid.    Assessment Findings:   Patient is confused, but agreeable to assessment.  Requires maximum assist x 2 and walker to transfer from chair to bed for assessment.  Tortoise positioning system in use with offloading heel boots.  Requires assistance to turn in bed.  Incontinent of bowel and bladder.  Nutrition team following.  Bilateral heels intact and blanchable with preventative foam dressings in place.  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  Wound edges jagged and irregular.  Skin flap noted proximally.  Tia-wound intact without induration or fluctuance. Per chart review, patient had seen gyn/onc and plastic surgery and decided to have no further intervention for this.    Left pretibial skin tear--pale pink, partial thickness tissue loss with no skin flap present.  No drainage.  Tia-wound intact.     See flowsheet for wound details.    Wound Care Plan:   1-Apply silicone bordered foam dressings to bilateral heels for prevention.  Ermias with P.  Peel back for skin assessments at least daily and re-apply.  Change dressings every 3 days and as needed.  2-Elevate heels off of bed/chair surface--offloading heel boots.  3-Offloading air cushion in chair when out of bed.  4-Apply moisturizing skin cream to body daily and as needed.  5-Turn/reposition every 2 hours while in bed and weight shift frequently while in chair for pressure re-distribution on skin.   6-Tortoise positioning system.  7-Left  pretibial--cleanse with normal saline, pat dry.  Apply silicone bordered foam dressing.  Change dressing every 3 days and as needed.  8-Left perineal wound--irrigate with warm water using joe squirt bottle.  Apply Vashe soak for 2-5 minutes, remove, pat dry.  Apply Triad paste two times daily and as needed with incontinence care.    Wound care team to follow.  Plan of care reviewed with primary RN.    Discussed assessment and recommendations with Dr. Da Silva--wound is at very high risk for infection as patient is incontinent.      Patient would benefit from follow-up for palliative wound care at the outpatient wound center.    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)   Wound Image   11/13/24 1033   Wound Description Pink;Beefy red 11/13/24 1033   Joe-wound Assessment Intact 11/13/24 1033   Wound Length (cm) 6.5 cm 11/13/24 1033   Wound Width (cm) 3 cm 11/13/24 1033   Wound Depth (cm) 0.2 cm 11/13/24 1033   Wound Surface Area (cm^2) 19.5 cm^2 11/13/24 1033   Wound Volume (cm^3) 3.9 cm^3 11/13/24 1033   Calculated Wound Volume (cm^3) 3.9 cm^3 11/13/24 1033   Drainage Amount Small 11/13/24 1033   Drainage Description Serosanguineous 11/13/24 1033   Non-staged Wound Description Full thickness 11/13/24 1033   Treatments Cleansed 11/13/24 1033   Dressing Open to air 11/13/24 1033   Patient Tolerance Tolerated well 11/13/24 1033       Wound 11/13/24 Pretibial Left (Active)   Wound Image   11/13/24 1024   Wound Description Pink 11/13/24 1024   Joe-wound Assessment Intact 11/13/24 1024   Wound Length (cm) 1.2 cm 11/13/24 1024   Wound Width (cm) 0.6 cm 11/13/24 1024   Wound Depth (cm) 0.1 cm 11/13/24 1024   Wound Surface Area (cm^2) 0.72 cm^2 11/13/24 1024   Wound Volume (cm^3) 0.072 cm^3 11/13/24 1024   Calculated Wound Volume (cm^3) 0.07 cm^3 11/13/24 1024   Drainage Amount None 11/13/24 1024   Non-staged Wound Description Partial thickness 11/13/24 1024   Treatments Cleansed 11/13/24 1024   Dressing Foam, Silicon  (eg. Allevyn, etc) 11/13/24 1024   Dressing Changed New 11/13/24 1024   Patient Tolerance Tolerated well 11/13/24 1024   Dressing Status Clean;Dry;Intact 11/13/24 1024           Kaylyn Reyes RN, BSN, CWON                      HEALTH ISSUES - PROBLEM Dx:  HR NBL      Protocol: [id number] Consolidation cycle 2    Interval History: Stable and afebrile. Emesis overnight dislodged NGT. Will replace today.   Received x1 ativan overnight for breakthrough nausea.   Received x1 oxycodone overnight for mucositis pain.     Change from previous past medical, family or social history:	[X] No	[] Yes:    REVIEW OF SYSTEMS  All review of systems negative, except for those marked:  General:		[] Abnormal:  Pulmonary:		[] Abnormal:  Cardiac:		[] Abnormal:  Gastrointestinal:	[x] Abnormal: CINV, mucositis, decreased PO  ENT:			[] Abnormal:  Renal/Urologic:	[] Abnormal:  Musculoskeletal	[] Abnormal:  Endocrine:		[] Abnormal:  Hematologic:		[x] Abnormal: pancytopenia   Neurologic:		[x] Abnormal: mucositis pain  Skin:			[] Abnormal:  Allergy/Immune		[] Abnormal:  Psychiatric:		[] Abnormal:    Allergies    penicillin (Rash)  cefepime (Anaphylaxis)  etoposide (Anaphylaxis)  fosaprepitant (Short breath)  ceftriaxone (Anaphylaxis)    Intolerances      Hematologic/Oncologic Medications:  ciprofloxacin 0.125 mG/mL - heparin Lock 100 Units/mL - Peds 2.5 milliLiter(s) Catheter <User Schedule>  ciprofloxacin 0.125 mG/mL - heparin Lock 100 Units/mL - Peds 2.5 milliLiter(s) Catheter <User Schedule>  heparin   Infusion -  Peds 4 Unit(s)/kG/Hr IV Continuous <Continuous>  heparin flush 100 Units/mL IntraVenous Injection - Peds 5 milliLiter(s) IV Push once  vancomycin 2 mG/mL - heparin  Lock 100 Units/mL - Peds 2.5 milliLiter(s) Catheter <User Schedule>  vancomycin 2 mG/mL - heparin  Lock 100 Units/mL - Peds 2.5 milliLiter(s) Catheter <User Schedule>    OTHER MEDICATIONS  (STANDING):  acyclovir  Oral Liquid - Peds 400 milliGRAM(s) Oral every 12 hours  chlorhexidine 2% Topical Cloths - Peds 1 Application(s) Topical daily  dextrose 5% + sodium chloride 0.9% - Pediatric 1000 milliLiter(s) IV Continuous <Continuous>  famotidine IV Intermittent - Peds 20 milliGRAM(s) IV Intermittent every 12 hours  filgrastim-sndz (ZARXIO) SubCutaneous Injection - Peds 255 MICROGram(s) SubCutaneous daily  fluconAZOLE IV Intermittent - Peds 315 milliGRAM(s) IV Intermittent every 24 hours  glutamine Oral Powder - Peds 3 Gram(s) Oral two times a day with meals  hydrOXYzine IV Intermittent - Peds 50 milliGRAM(s) IV Intermittent every 6 hours  levoFLOXacin  Oral Liquid - Peds 500 milliGRAM(s) Oral daily  OLANZapine  Oral Tab/Cap - Peds 10 milliGRAM(s) Oral at bedtime  ondansetron IV Intermittent - Peds 7.8 milliGRAM(s) IV Intermittent every 8 hours  phytonadione  Oral Liquid - Peds 10 milliGRAM(s) Oral every week  scopolamine 1 mG/72 Hr(s) Transdermal Patch - Peds 1 Patch Transdermal every 72 hours  ursodiol Oral Liquid - Peds 300 milliGRAM(s) Oral two times a day with meals  vancomycin  Oral Liquid - Peds 125 milliGRAM(s) Oral every 12 hours    MEDICATIONS  (PRN):  acetaminophen   IV Intermittent - Peds. 750 milliGRAM(s) IV Intermittent every 6 hours PRN Temp greater or equal to 38C (100.4F), Mild Pain (1 - 3), Moderate Pain (4 -  6)  benzocaine  15 mG/menthol 3.6 mG Oral Lozenge - Peds 1 Lozenge Oral every 4 hours PRN Sore Throat  FIRST- Mouthwash  BLM - Peds 15 milliLiter(s) Swish and Spit three times a day PRN Mouth Pain  LORazepam IV Push - Peds 1 milliGRAM(s) IV Push every 8 hours PRN Nausea and/or Vomiting, second line  oxyCODONE   Oral Liquid - Peds 5 milliGRAM(s) Oral every 6 hours PRN Moderate Pain (4 - 6)  polyethylene glycol 3350 Oral Powder - Peds 17 Gram(s) Enteral Tube daily PRN Constipation    DIET:    Vital Signs Last 24 Hrs  T(C): 37.7 (06 Jun 2025 05:40), Max: 37.7 (06 Jun 2025 05:40)  T(F): 99.8 (06 Jun 2025 05:40), Max: 99.8 (06 Jun 2025 05:40)  HR: 114 (06 Jun 2025 05:40) (92 - 121)  BP: 103/60 (06 Jun 2025 05:40) (93/48 - 118/79)  BP(mean): --  RR: 18 (06 Jun 2025 05:40) (18 - 24)  SpO2: 100% (06 Jun 2025 05:40) (99% - 100%)    Parameters below as of 06 Jun 2025 05:40  Patient On (Oxygen Delivery Method): room air      I&O's Summary    05 Jun 2025 07:01  -  06 Jun 2025 07:00  --------------------------------------------------------  IN: 2851.8 mL / OUT: 1500 mL / NET: 1351.8 mL      Pain Score (0-10): 4		Lansky/Karnofsky Score: 70    PATIENT CARE ACCESS  [] Peripheral IV  [] Central Venous Line	[] R	[] L	[] IJ	[] Fem	[] SC			[] Placed:  [] PICC, Date Placed:			[] Broviac – __ Lumen, Date Placed:  [x] Mediport, Date Placed:		[] MedComp, Date Placed:  [] Urinary Catheter, Date Placed:  []  Shunt, Date Placed:		Programmable:		[] Yes	[] No  [] Ommaya, Date Placed:  [X] Necessity of urinary, arterial, and venous catheters discussed      PHYSICAL EXAM  All physical exam findings normal, except those marked:  Constitutional:	Well appearing, in no apparent distress  Eyes		DILIP, no conjunctival injection, symmetric gaze  ENT:		tongue scalloping  Neck		No thyromegaly or masses appreciated  Cardiovascular	Regular rate and rhythm, normal S1, S2, no murmurs, rubs or gallops  Respiratory	Clear to auscultation bilaterally, no wheezing  Abdominal	Normoactive bowel sounds, soft, NT, no hepatosplenomegaly, no masses appreciated   Lymphatic	Normal: no adenopathy appreciated  Extremities	No cyanosis or edema, symmetric pulses  Skin		No rashes or nodules  Neurologic	No focal deficits, gait normal and normal motor exam  Psychiatric	Appropriate affect   Musculoskeletal		Full range of motion and no deformities appreciated, normal strength in all extremities      Lab Results:                                            7.7                   Neurophils% (auto):   x      (06-05 @ 20:40):    0.01 )-----------(24           Lymphocytes% (auto):  x                                             22.3                   Eosinphils% (auto):   x        Manual%: Neutrophils x    ; Lymphocytes x    ; Eosinophils x    ; Bands%: x    ; Blasts x         Differential:	[] Automated		[] Manual    06-05    139  |  104  |  7   ----------------------------<  92  3.6   |  25  |  0.46[L]    Ca    8.6      05 Jun 2025 20:40  Phos  3.3     06-05  Mg     1.50     06-05    TPro  5.9[L]  /  Alb  3.8  /  TBili  0.3  /  DBili  x   /  AST  31  /  ALT  58[H]  /  AlkPhos  104[L]  06-05    LIVER FUNCTIONS - ( 05 Jun 2025 20:40 )  Alb: 3.8 g/dL / Pro: 5.9 g/dL / ALK PHOS: 104 U/L / ALT: 58 U/L / AST: 31 U/L / GGT: x             Urinalysis Basic - ( 05 Jun 2025 20:40 )    Color: x / Appearance: x / SG: x / pH: x  Gluc: 92 mg/dL / Ketone: x  / Bili: x / Urobili: x   Blood: x / Protein: x / Nitrite: x   Leuk Esterase: x / RBC: x / WBC x   Sq Epi: x / Non Sq Epi: x / Bacteria: x      VENOOCCLUSIVE DISEASE  Prophylaxis:  Glutamine	[ x]  Heparin		[ x]  Ursodiol	[ x]    Signs/Symptoms:  Hepatomegaly		[ ]  Hyperbilirubinemia	[ ]  Weight gain		[ ] % over baseline:  Ascites			[ ]  Renal dysfunction	[ ]  Coagulopathy		[ ]  Pulmonary Symptoms	[ ]    Management:   HEALTH ISSUES - PROBLEM Dx:  HR NBL      Protocol: [id number] Consolidation cycle 2    Interval History: Stable and afebrile. Emesis overnight dislodged NGT. Will replace today.   Received x1 ativan overnight for breakthrough nausea.   Received x1 oxycodone overnight for mucositis pain.     Change from previous past medical, family or social history:	[X] No	[] Yes:    REVIEW OF SYSTEMS  All review of systems negative, except for those marked:  General:		[] Abnormal:  Pulmonary:		[] Abnormal:  Cardiac:		[] Abnormal:  Gastrointestinal:	[x] Abnormal: CINV, mucositis, decreased PO  ENT:			[] Abnormal:  Renal/Urologic:	[] Abnormal:  Musculoskeletal	[] Abnormal:  Endocrine:		[] Abnormal:  Hematologic:		[x] Abnormal: pancytopenia   Neurologic:		[x] Abnormal: mucositis pain  Skin:			[] Abnormal:  Allergy/Immune		[] Abnormal:  Psychiatric:		[] Abnormal:    Allergies    penicillin (Rash)  cefepime (Anaphylaxis)  etoposide (Anaphylaxis)  fosaprepitant (Short breath)  ceftriaxone (Anaphylaxis)    Intolerances      Hematologic/Oncologic Medications:  ciprofloxacin 0.125 mG/mL - heparin Lock 100 Units/mL - Peds 2.5 milliLiter(s) Catheter <User Schedule>  ciprofloxacin 0.125 mG/mL - heparin Lock 100 Units/mL - Peds 2.5 milliLiter(s) Catheter <User Schedule>  heparin   Infusion -  Peds 4 Unit(s)/kG/Hr IV Continuous <Continuous>  heparin flush 100 Units/mL IntraVenous Injection - Peds 5 milliLiter(s) IV Push once  vancomycin 2 mG/mL - heparin  Lock 100 Units/mL - Peds 2.5 milliLiter(s) Catheter <User Schedule>  vancomycin 2 mG/mL - heparin  Lock 100 Units/mL - Peds 2.5 milliLiter(s) Catheter <User Schedule>    OTHER MEDICATIONS  (STANDING):  acyclovir  Oral Liquid - Peds 400 milliGRAM(s) Oral every 12 hours  chlorhexidine 2% Topical Cloths - Peds 1 Application(s) Topical daily  dextrose 5% + sodium chloride 0.9% - Pediatric 1000 milliLiter(s) IV Continuous <Continuous>  famotidine IV Intermittent - Peds 20 milliGRAM(s) IV Intermittent every 12 hours  filgrastim-sndz (ZARXIO) SubCutaneous Injection - Peds 255 MICROGram(s) SubCutaneous daily  fluconAZOLE IV Intermittent - Peds 315 milliGRAM(s) IV Intermittent every 24 hours  glutamine Oral Powder - Peds 3 Gram(s) Oral two times a day with meals  hydrOXYzine IV Intermittent - Peds 50 milliGRAM(s) IV Intermittent every 6 hours  levoFLOXacin  Oral Liquid - Peds 500 milliGRAM(s) Oral daily  OLANZapine  Oral Tab/Cap - Peds 10 milliGRAM(s) Oral at bedtime  ondansetron IV Intermittent - Peds 7.8 milliGRAM(s) IV Intermittent every 8 hours  phytonadione  Oral Liquid - Peds 10 milliGRAM(s) Oral every week  scopolamine 1 mG/72 Hr(s) Transdermal Patch - Peds 1 Patch Transdermal every 72 hours  ursodiol Oral Liquid - Peds 300 milliGRAM(s) Oral two times a day with meals  vancomycin  Oral Liquid - Peds 125 milliGRAM(s) Oral every 12 hours    MEDICATIONS  (PRN):  acetaminophen   IV Intermittent - Peds. 750 milliGRAM(s) IV Intermittent every 6 hours PRN Temp greater or equal to 38C (100.4F), Mild Pain (1 - 3), Moderate Pain (4 -  6)  benzocaine  15 mG/menthol 3.6 mG Oral Lozenge - Peds 1 Lozenge Oral every 4 hours PRN Sore Throat  FIRST- Mouthwash  BLM - Peds 15 milliLiter(s) Swish and Spit three times a day PRN Mouth Pain  LORazepam IV Push - Peds 1 milliGRAM(s) IV Push every 8 hours PRN Nausea and/or Vomiting, second line  oxyCODONE   Oral Liquid - Peds 5 milliGRAM(s) Oral every 6 hours PRN Moderate Pain (4 - 6)  polyethylene glycol 3350 Oral Powder - Peds 17 Gram(s) Enteral Tube daily PRN Constipation    DIET:    Vital Signs Last 24 Hrs  T(C): 37.7 (06 Jun 2025 05:40), Max: 37.7 (06 Jun 2025 05:40)  T(F): 99.8 (06 Jun 2025 05:40), Max: 99.8 (06 Jun 2025 05:40)  HR: 114 (06 Jun 2025 05:40) (92 - 121)  BP: 103/60 (06 Jun 2025 05:40) (93/48 - 118/79)  BP(mean): --  RR: 18 (06 Jun 2025 05:40) (18 - 24)  SpO2: 100% (06 Jun 2025 05:40) (99% - 100%)    Parameters below as of 06 Jun 2025 05:40  Patient On (Oxygen Delivery Method): room air      I&O's Summary    05 Jun 2025 07:01  -  06 Jun 2025 07:00  --------------------------------------------------------  IN: 2851.8 mL / OUT: 1500 mL / NET: 1351.8 mL      Pain Score (0-10): 8		Lansky/Karnofsky Score: 70    PATIENT CARE ACCESS  [] Peripheral IV  [] Central Venous Line	[] R	[] L	[] IJ	[] Fem	[] SC			[] Placed:  [] PICC, Date Placed:			[] Broviac – __ Lumen, Date Placed:  [x] Mediport, Date Placed:		[] MedComp, Date Placed:  [] Urinary Catheter, Date Placed:  []  Shunt, Date Placed:		Programmable:		[] Yes	[] No  [] Ommaya, Date Placed:  [X] Necessity of urinary, arterial, and venous catheters discussed      PHYSICAL EXAM  All physical exam findings normal, except those marked:  Constitutional:	Well appearing, in no apparent distress  Eyes		DILIP, no conjunctival injection, symmetric gaze  ENT:		tongue scalloping  Neck		No thyromegaly or masses appreciated  Cardiovascular	Regular rate and rhythm, normal S1, S2, no murmurs, rubs or gallops  Respiratory	Clear to auscultation bilaterally, no wheezing  Abdominal	Normoactive bowel sounds, soft, NT, no hepatosplenomegaly, no masses appreciated   Lymphatic	Normal: no adenopathy appreciated  Extremities	No cyanosis or edema, symmetric pulses  Skin		No rashes or nodules  Neurologic	No focal deficits, gait normal and normal motor exam  Psychiatric	Appropriate affect   Musculoskeletal		Full range of motion and no deformities appreciated, normal strength in all extremities      Lab Results:                                            7.7                   Neurophils% (auto):   x      (06-05 @ 20:40):    0.01 )-----------(24           Lymphocytes% (auto):  x                                             22.3                   Eosinphils% (auto):   x        Manual%: Neutrophils x    ; Lymphocytes x    ; Eosinophils x    ; Bands%: x    ; Blasts x         Differential:	[] Automated		[] Manual    06-05    139  |  104  |  7   ----------------------------<  92  3.6   |  25  |  0.46[L]    Ca    8.6      05 Jun 2025 20:40  Phos  3.3     06-05  Mg     1.50     06-05    TPro  5.9[L]  /  Alb  3.8  /  TBili  0.3  /  DBili  x   /  AST  31  /  ALT  58[H]  /  AlkPhos  104[L]  06-05    LIVER FUNCTIONS - ( 05 Jun 2025 20:40 )  Alb: 3.8 g/dL / Pro: 5.9 g/dL / ALK PHOS: 104 U/L / ALT: 58 U/L / AST: 31 U/L / GGT: x             Urinalysis Basic - ( 05 Jun 2025 20:40 )    Color: x / Appearance: x / SG: x / pH: x  Gluc: 92 mg/dL / Ketone: x  / Bili: x / Urobili: x   Blood: x / Protein: x / Nitrite: x   Leuk Esterase: x / RBC: x / WBC x   Sq Epi: x / Non Sq Epi: x / Bacteria: x      VENOOCCLUSIVE DISEASE  Prophylaxis:  Glutamine	[ x]  Heparin		[ x]  Ursodiol	[ x]    Signs/Symptoms:  Hepatomegaly		[ ]  Hyperbilirubinemia	[ ]  Weight gain		[ ] % over baseline:  Ascites			[ ]  Renal dysfunction	[ ]  Coagulopathy		[ ]  Pulmonary Symptoms	[ ]    Management:   HEALTH ISSUES - PROBLEM Dx:  HR NBL      Protocol: [id number] Consolidation cycle 2    Interval History: Stable and afebrile. Emesis overnight dislodged NGT. Will replace today.   Received x1 ativan overnight for breakthrough nausea.   Received x1 oxycodone overnight for mucositis pain.   Received pRBCs overnight for hb 7.7.    Change from previous past medical, family or social history:	[X] No	[] Yes:    REVIEW OF SYSTEMS  All review of systems negative, except for those marked:  General:		[] Abnormal:  Pulmonary:		[] Abnormal:  Cardiac:		[] Abnormal:  Gastrointestinal:	[x] Abnormal: CINV, mucositis, decreased PO  ENT:			[] Abnormal:  Renal/Urologic:	[] Abnormal:  Musculoskeletal	[] Abnormal:  Endocrine:		[] Abnormal:  Hematologic:		[x] Abnormal: pancytopenia   Neurologic:		[x] Abnormal: mucositis pain  Skin:			[] Abnormal:  Allergy/Immune		[] Abnormal:  Psychiatric:		[] Abnormal:    Allergies    penicillin (Rash)  cefepime (Anaphylaxis)  etoposide (Anaphylaxis)  fosaprepitant (Short breath)  ceftriaxone (Anaphylaxis)    Intolerances      Hematologic/Oncologic Medications:  ciprofloxacin 0.125 mG/mL - heparin Lock 100 Units/mL - Peds 2.5 milliLiter(s) Catheter <User Schedule>  ciprofloxacin 0.125 mG/mL - heparin Lock 100 Units/mL - Peds 2.5 milliLiter(s) Catheter <User Schedule>  heparin   Infusion -  Peds 4 Unit(s)/kG/Hr IV Continuous <Continuous>  heparin flush 100 Units/mL IntraVenous Injection - Peds 5 milliLiter(s) IV Push once  vancomycin 2 mG/mL - heparin  Lock 100 Units/mL - Peds 2.5 milliLiter(s) Catheter <User Schedule>  vancomycin 2 mG/mL - heparin  Lock 100 Units/mL - Peds 2.5 milliLiter(s) Catheter <User Schedule>    OTHER MEDICATIONS  (STANDING):  acyclovir  Oral Liquid - Peds 400 milliGRAM(s) Oral every 12 hours  chlorhexidine 2% Topical Cloths - Peds 1 Application(s) Topical daily  dextrose 5% + sodium chloride 0.9% - Pediatric 1000 milliLiter(s) IV Continuous <Continuous>  famotidine IV Intermittent - Peds 20 milliGRAM(s) IV Intermittent every 12 hours  filgrastim-sndz (ZARXIO) SubCutaneous Injection - Peds 255 MICROGram(s) SubCutaneous daily  fluconAZOLE IV Intermittent - Peds 315 milliGRAM(s) IV Intermittent every 24 hours  glutamine Oral Powder - Peds 3 Gram(s) Oral two times a day with meals  hydrOXYzine IV Intermittent - Peds 50 milliGRAM(s) IV Intermittent every 6 hours  levoFLOXacin  Oral Liquid - Peds 500 milliGRAM(s) Oral daily  OLANZapine  Oral Tab/Cap - Peds 10 milliGRAM(s) Oral at bedtime  ondansetron IV Intermittent - Peds 7.8 milliGRAM(s) IV Intermittent every 8 hours  phytonadione  Oral Liquid - Peds 10 milliGRAM(s) Oral every week  scopolamine 1 mG/72 Hr(s) Transdermal Patch - Peds 1 Patch Transdermal every 72 hours  ursodiol Oral Liquid - Peds 300 milliGRAM(s) Oral two times a day with meals  vancomycin  Oral Liquid - Peds 125 milliGRAM(s) Oral every 12 hours    MEDICATIONS  (PRN):  acetaminophen   IV Intermittent - Peds. 750 milliGRAM(s) IV Intermittent every 6 hours PRN Temp greater or equal to 38C (100.4F), Mild Pain (1 - 3), Moderate Pain (4 -  6)  benzocaine  15 mG/menthol 3.6 mG Oral Lozenge - Peds 1 Lozenge Oral every 4 hours PRN Sore Throat  FIRST- Mouthwash  BLM - Peds 15 milliLiter(s) Swish and Spit three times a day PRN Mouth Pain  LORazepam IV Push - Peds 1 milliGRAM(s) IV Push every 8 hours PRN Nausea and/or Vomiting, second line  oxyCODONE   Oral Liquid - Peds 5 milliGRAM(s) Oral every 6 hours PRN Moderate Pain (4 - 6)  polyethylene glycol 3350 Oral Powder - Peds 17 Gram(s) Enteral Tube daily PRN Constipation    DIET:    Vital Signs Last 24 Hrs  T(C): 37.7 (06 Jun 2025 05:40), Max: 37.7 (06 Jun 2025 05:40)  T(F): 99.8 (06 Jun 2025 05:40), Max: 99.8 (06 Jun 2025 05:40)  HR: 114 (06 Jun 2025 05:40) (92 - 121)  BP: 103/60 (06 Jun 2025 05:40) (93/48 - 118/79)  BP(mean): --  RR: 18 (06 Jun 2025 05:40) (18 - 24)  SpO2: 100% (06 Jun 2025 05:40) (99% - 100%)    Parameters below as of 06 Jun 2025 05:40  Patient On (Oxygen Delivery Method): room air      I&O's Summary    05 Jun 2025 07:01  -  06 Jun 2025 07:00  --------------------------------------------------------  IN: 2851.8 mL / OUT: 1500 mL / NET: 1351.8 mL      Pain Score (0-10): 8		Lansky/Karnofsky Score: 70    PATIENT CARE ACCESS  [] Peripheral IV  [] Central Venous Line	[] R	[] L	[] IJ	[] Fem	[] SC			[] Placed:  [] PICC, Date Placed:			[] Broviac – __ Lumen, Date Placed:  [x] Mediport, Date Placed:		[] MedComp, Date Placed:  [] Urinary Catheter, Date Placed:  []  Shunt, Date Placed:		Programmable:		[] Yes	[] No  [] Ommaya, Date Placed:  [X] Necessity of urinary, arterial, and venous catheters discussed      PHYSICAL EXAM  All physical exam findings normal, except those marked:  Constitutional:	Well appearing, in no apparent distress  Eyes		DILIP, no conjunctival injection, symmetric gaze  ENT:		tongue scalloping  Neck		No thyromegaly or masses appreciated  Cardiovascular	Regular rate and rhythm, normal S1, S2, no murmurs, rubs or gallops  Respiratory	Clear to auscultation bilaterally, no wheezing  Abdominal	Normoactive bowel sounds, soft, NT, no hepatosplenomegaly, no masses appreciated   Lymphatic	Normal: no adenopathy appreciated  Extremities	No cyanosis or edema, symmetric pulses  Skin		No rashes or nodules  Neurologic	No focal deficits, gait normal and normal motor exam  Psychiatric	Appropriate affect   Musculoskeletal		Full range of motion and no deformities appreciated, normal strength in all extremities      Lab Results:                                            7.7                   Neurophils% (auto):   x      (06-05 @ 20:40):    0.01 )-----------(24           Lymphocytes% (auto):  x                                             22.3                   Eosinphils% (auto):   x        Manual%: Neutrophils x    ; Lymphocytes x    ; Eosinophils x    ; Bands%: x    ; Blasts x         Differential:	[] Automated		[] Manual    06-05    139  |  104  |  7   ----------------------------<  92  3.6   |  25  |  0.46[L]    Ca    8.6      05 Jun 2025 20:40  Phos  3.3     06-05  Mg     1.50     06-05    TPro  5.9[L]  /  Alb  3.8  /  TBili  0.3  /  DBili  x   /  AST  31  /  ALT  58[H]  /  AlkPhos  104[L]  06-05    LIVER FUNCTIONS - ( 05 Jun 2025 20:40 )  Alb: 3.8 g/dL / Pro: 5.9 g/dL / ALK PHOS: 104 U/L / ALT: 58 U/L / AST: 31 U/L / GGT: x             Urinalysis Basic - ( 05 Jun 2025 20:40 )    Color: x / Appearance: x / SG: x / pH: x  Gluc: 92 mg/dL / Ketone: x  / Bili: x / Urobili: x   Blood: x / Protein: x / Nitrite: x   Leuk Esterase: x / RBC: x / WBC x   Sq Epi: x / Non Sq Epi: x / Bacteria: x      VENOOCCLUSIVE DISEASE  Prophylaxis:  Glutamine	[ x]  Heparin		[ x]  Ursodiol	[ x]    Signs/Symptoms:  Hepatomegaly		[ ]  Hyperbilirubinemia	[ ]  Weight gain		[ ] % over baseline:  Ascites			[ ]  Renal dysfunction	[ ]  Coagulopathy		[ ]  Pulmonary Symptoms	[ ]    Management:

## (undated) DEVICE — PREMIUM DRY TRAY LF: Brand: MEDLINE INDUSTRIES, INC.

## (undated) DEVICE — SYRINGE 20ML LL

## (undated) DEVICE — 3M™ STERI-STRIP™ COMPOUND BENZOIN TINCTURE 40 BAGS/CARTON 4 CARTONS/CASE C1544: Brand: 3M™ STERI-STRIP™

## (undated) DEVICE — UROLOGIC DRAIN BAG: Brand: UNBRANDED

## (undated) DEVICE — INVIEW CLEAR LEGGINGS: Brand: CONVERTORS

## (undated) DEVICE — EXIDINE 4 PCT

## (undated) DEVICE — LUBRICANT JELLY SURGILUBE TUBE 2OZ FLIP TOP

## (undated) DEVICE — SPECIMEN CONTAINER STERILE PEEL PACK

## (undated) DEVICE — GUIDEWIRE STRGHT TIP 0.035 IN  SOLO PLUS

## (undated) DEVICE — GLOVE SRG BIOGEL 7.5

## (undated) DEVICE — TUBING SUCTION 5MM X 12 FT

## (undated) DEVICE — SCD SEQUENTIAL COMPRESSION COMFORT SLEEVE MEDIUM KNEE LENGTH: Brand: KENDALL SCD

## (undated) DEVICE — PACK TUR